# Patient Record
Sex: FEMALE | Race: ASIAN | NOT HISPANIC OR LATINO
[De-identification: names, ages, dates, MRNs, and addresses within clinical notes are randomized per-mention and may not be internally consistent; named-entity substitution may affect disease eponyms.]

---

## 2018-05-01 PROBLEM — Z00.00 ENCOUNTER FOR PREVENTIVE HEALTH EXAMINATION: Status: ACTIVE | Noted: 2018-05-01

## 2018-05-09 ENCOUNTER — APPOINTMENT (OUTPATIENT)
Dept: BARIATRICS | Facility: CLINIC | Age: 38
End: 2018-05-09
Payer: MEDICAID

## 2018-06-20 ENCOUNTER — LABORATORY RESULT (OUTPATIENT)
Age: 38
End: 2018-06-20

## 2018-06-20 ENCOUNTER — APPOINTMENT (OUTPATIENT)
Dept: BARIATRICS | Facility: CLINIC | Age: 38
End: 2018-06-20
Payer: MEDICAID

## 2018-06-20 VITALS
SYSTOLIC BLOOD PRESSURE: 156 MMHG | TEMPERATURE: 98.3 F | DIASTOLIC BLOOD PRESSURE: 111 MMHG | HEART RATE: 107 BPM | BODY MASS INDEX: 53.92 KG/M2 | OXYGEN SATURATION: 97 % | WEIGHT: 293 LBS | HEIGHT: 62 IN

## 2018-06-20 DIAGNOSIS — Z83.3 FAMILY HISTORY OF DIABETES MELLITUS: ICD-10-CM

## 2018-06-20 DIAGNOSIS — Z78.9 OTHER SPECIFIED HEALTH STATUS: ICD-10-CM

## 2018-06-20 DIAGNOSIS — Z87.19 PERSONAL HISTORY OF OTHER DISEASES OF THE DIGESTIVE SYSTEM: ICD-10-CM

## 2018-06-20 PROCEDURE — 99205 OFFICE O/P NEW HI 60 MIN: CPT

## 2018-06-25 ENCOUNTER — OTHER (OUTPATIENT)
Age: 38
End: 2018-06-25

## 2018-06-25 DIAGNOSIS — D50.9 IRON DEFICIENCY ANEMIA, UNSPECIFIED: ICD-10-CM

## 2018-06-25 LAB
ALBUMIN SERPL ELPH-MCNC: 3.9 G/DL
ALP BLD-CCNC: 61 U/L
ALT SERPL-CCNC: 32 U/L
ANION GAP SERPL CALC-SCNC: 16 MMOL/L
APTT BLD: 35.9 SEC
AST SERPL-CCNC: 28 U/L
BASOPHILS # BLD AUTO: 0.04 K/UL
BASOPHILS NFR BLD AUTO: 0.4 %
BILIRUB SERPL-MCNC: 0.2 MG/DL
BUN SERPL-MCNC: 17 MG/DL
CALCIUM SERPL-MCNC: 9.7 MG/DL
CHLORIDE SERPL-SCNC: 99 MMOL/L
CHOLEST SERPL-MCNC: 136 MG/DL
CHOLEST/HDLC SERPL: 2.3 RATIO
CO2 SERPL-SCNC: 26 MMOL/L
CREAT SERPL-MCNC: 0.7 MG/DL
EOSINOPHIL # BLD AUTO: 0.23 K/UL
EOSINOPHIL NFR BLD AUTO: 2.4 %
FERRITIN SERPL-MCNC: 15 NG/ML
FOLATE SERPL-MCNC: 12.8 NG/ML
GLUCOSE SERPL-MCNC: 102 MG/DL
HBA1C MFR BLD HPLC: 6 %
HCT VFR BLD CALC: 38.2 %
HDLC SERPL-MCNC: 59 MG/DL
HGB BLD-MCNC: 10.9 G/DL
IMM GRANULOCYTES NFR BLD AUTO: 0.4 %
INR PPP: 1.04 RATIO
IRON SATN MFR SERPL: 6 %
IRON SERPL-MCNC: 27 UG/DL
LDLC SERPL CALC-MCNC: 56 MG/DL
LYMPHOCYTES # BLD AUTO: 3.21 K/UL
LYMPHOCYTES NFR BLD AUTO: 33.1 %
MAN DIFF?: NORMAL
MCHC RBC-ENTMCNC: 21.2 PG
MCHC RBC-ENTMCNC: 28.5 GM/DL
MCV RBC AUTO: 74.2 FL
MONOCYTES # BLD AUTO: 0.61 K/UL
MONOCYTES NFR BLD AUTO: 6.3 %
NEUTROPHILS # BLD AUTO: 5.58 K/UL
NEUTROPHILS NFR BLD AUTO: 57.4 %
PLATELET # BLD AUTO: 452 K/UL
POTASSIUM SERPL-SCNC: 4.4 MMOL/L
PROT SERPL-MCNC: 8.4 G/DL
PT BLD: 11.8 SEC
RBC # BLD: 5.15 M/UL
RBC # FLD: 18.3 %
SODIUM SERPL-SCNC: 141 MMOL/L
TIBC SERPL-MCNC: 423 UG/DL
TRIGL SERPL-MCNC: 104 MG/DL
TSH SERPL-ACNC: 2.89 UIU/ML
UIBC SERPL-MCNC: 396 UG/DL
VIT B1 SERPL-MCNC: 112.6 NMOL/L
VIT B12 SERPL-MCNC: 406 PG/ML
WBC # FLD AUTO: 9.71 K/UL

## 2018-07-02 ENCOUNTER — APPOINTMENT (OUTPATIENT)
Dept: CARDIOLOGY | Facility: CLINIC | Age: 38
End: 2018-07-02
Payer: MEDICAID

## 2018-07-02 VITALS
SYSTOLIC BLOOD PRESSURE: 118 MMHG | HEIGHT: 62 IN | HEART RATE: 110 BPM | BODY MASS INDEX: 53.92 KG/M2 | DIASTOLIC BLOOD PRESSURE: 78 MMHG | OXYGEN SATURATION: 98 % | WEIGHT: 293 LBS | RESPIRATION RATE: 18 BRPM

## 2018-07-02 PROCEDURE — 99203 OFFICE O/P NEW LOW 30 MIN: CPT

## 2018-07-02 PROCEDURE — 93000 ELECTROCARDIOGRAM COMPLETE: CPT

## 2018-07-10 ENCOUNTER — APPOINTMENT (OUTPATIENT)
Dept: PULMONOLOGY | Facility: CLINIC | Age: 38
End: 2018-07-10
Payer: MEDICAID

## 2018-07-10 VITALS
OXYGEN SATURATION: 84 % | HEART RATE: 122 BPM | DIASTOLIC BLOOD PRESSURE: 104 MMHG | BODY MASS INDEX: 53.92 KG/M2 | HEIGHT: 62 IN | TEMPERATURE: 99.2 F | SYSTOLIC BLOOD PRESSURE: 146 MMHG | WEIGHT: 293 LBS

## 2018-07-10 PROCEDURE — 94726 PLETHYSMOGRAPHY LUNG VOLUMES: CPT

## 2018-07-10 PROCEDURE — ZZZZZ: CPT

## 2018-07-10 PROCEDURE — 94010 BREATHING CAPACITY TEST: CPT

## 2018-07-10 PROCEDURE — 94729 DIFFUSING CAPACITY: CPT

## 2018-07-10 PROCEDURE — 99203 OFFICE O/P NEW LOW 30 MIN: CPT | Mod: 25

## 2018-07-16 ENCOUNTER — APPOINTMENT (OUTPATIENT)
Dept: CARDIOLOGY | Facility: CLINIC | Age: 38
End: 2018-07-16

## 2018-07-19 ENCOUNTER — APPOINTMENT (OUTPATIENT)
Dept: PULMONOLOGY | Facility: CLINIC | Age: 38
End: 2018-07-19

## 2018-07-19 ENCOUNTER — APPOINTMENT (OUTPATIENT)
Dept: GASTROENTEROLOGY | Facility: CLINIC | Age: 38
End: 2018-07-19
Payer: MEDICAID

## 2018-07-19 ENCOUNTER — OTHER (OUTPATIENT)
Age: 38
End: 2018-07-19

## 2018-07-19 VITALS
RESPIRATION RATE: 18 BRPM | HEIGHT: 62 IN | DIASTOLIC BLOOD PRESSURE: 84 MMHG | OXYGEN SATURATION: 90 % | BODY MASS INDEX: 53.92 KG/M2 | WEIGHT: 293 LBS | TEMPERATURE: 99.6 F | SYSTOLIC BLOOD PRESSURE: 144 MMHG | HEART RATE: 115 BPM

## 2018-07-19 PROCEDURE — 99203 OFFICE O/P NEW LOW 30 MIN: CPT

## 2018-07-25 ENCOUNTER — OTHER (OUTPATIENT)
Age: 38
End: 2018-07-25

## 2018-08-06 ENCOUNTER — OUTPATIENT (OUTPATIENT)
Dept: OUTPATIENT SERVICES | Facility: HOSPITAL | Age: 38
LOS: 1 days | End: 2018-08-06
Payer: MEDICAID

## 2018-08-06 ENCOUNTER — APPOINTMENT (OUTPATIENT)
Dept: CARDIOLOGY | Facility: CLINIC | Age: 38
End: 2018-08-06

## 2018-08-06 DIAGNOSIS — Z00.8 ENCOUNTER FOR OTHER GENERAL EXAMINATION: ICD-10-CM

## 2018-08-06 PROCEDURE — 93306 TTE W/DOPPLER COMPLETE: CPT | Mod: 26

## 2018-08-06 PROCEDURE — 93306 TTE W/DOPPLER COMPLETE: CPT

## 2018-08-07 ENCOUNTER — CHART COPY (OUTPATIENT)
Age: 38
End: 2018-08-07

## 2018-08-13 ENCOUNTER — APPOINTMENT (OUTPATIENT)
Dept: CARDIOLOGY | Facility: CLINIC | Age: 38
End: 2018-08-13
Payer: MEDICAID

## 2018-08-13 ENCOUNTER — APPOINTMENT (OUTPATIENT)
Dept: CARDIOLOGY | Facility: CLINIC | Age: 38
End: 2018-08-13

## 2018-08-13 ENCOUNTER — OUTPATIENT (OUTPATIENT)
Dept: OUTPATIENT SERVICES | Facility: HOSPITAL | Age: 38
LOS: 1 days | End: 2018-08-13

## 2018-08-13 VITALS
RESPIRATION RATE: 18 BRPM | OXYGEN SATURATION: 97 % | DIASTOLIC BLOOD PRESSURE: 92 MMHG | WEIGHT: 293 LBS | HEIGHT: 62 IN | BODY MASS INDEX: 53.92 KG/M2 | HEART RATE: 107 BPM | SYSTOLIC BLOOD PRESSURE: 166 MMHG

## 2018-08-13 DIAGNOSIS — Z00.8 ENCOUNTER FOR OTHER GENERAL EXAMINATION: ICD-10-CM

## 2018-08-13 PROCEDURE — 99213 OFFICE O/P EST LOW 20 MIN: CPT

## 2018-09-05 ENCOUNTER — OUTPATIENT (OUTPATIENT)
Dept: OUTPATIENT SERVICES | Facility: HOSPITAL | Age: 38
LOS: 1 days | End: 2018-09-05
Payer: MEDICAID

## 2018-09-05 ENCOUNTER — RESULT REVIEW (OUTPATIENT)
Age: 38
End: 2018-09-05

## 2018-09-05 DIAGNOSIS — Z01.818 ENCOUNTER FOR OTHER PREPROCEDURAL EXAMINATION: ICD-10-CM

## 2018-09-05 LAB — HCG UR QL: NEGATIVE — SIGNIFICANT CHANGE UP

## 2018-09-05 PROCEDURE — 43239 EGD BIOPSY SINGLE/MULTIPLE: CPT

## 2018-09-05 PROCEDURE — 81025 URINE PREGNANCY TEST: CPT

## 2018-09-05 PROCEDURE — 88312 SPECIAL STAINS GROUP 1: CPT | Mod: 26

## 2018-09-05 PROCEDURE — 88305 TISSUE EXAM BY PATHOLOGIST: CPT

## 2018-09-05 PROCEDURE — 88305 TISSUE EXAM BY PATHOLOGIST: CPT | Mod: 26

## 2018-09-05 PROCEDURE — 88312 SPECIAL STAINS GROUP 1: CPT

## 2018-09-10 ENCOUNTER — APPOINTMENT (OUTPATIENT)
Dept: CARDIOLOGY | Facility: CLINIC | Age: 38
End: 2018-09-10

## 2018-09-19 ENCOUNTER — OTHER (OUTPATIENT)
Age: 38
End: 2018-09-19

## 2018-10-04 ENCOUNTER — RESULT CHARGE (OUTPATIENT)
Age: 38
End: 2018-10-04

## 2018-10-04 ENCOUNTER — RESULT REVIEW (OUTPATIENT)
Age: 38
End: 2018-10-04

## 2018-10-04 DIAGNOSIS — A04.8 OTHER SPECIFIED BACTERIAL INTESTINAL INFECTIONS: ICD-10-CM

## 2018-10-08 ENCOUNTER — APPOINTMENT (OUTPATIENT)
Dept: CARDIOLOGY | Facility: CLINIC | Age: 38
End: 2018-10-08
Payer: MEDICAID

## 2018-10-08 VITALS
HEIGHT: 62 IN | HEART RATE: 112 BPM | SYSTOLIC BLOOD PRESSURE: 145 MMHG | OXYGEN SATURATION: 95 % | RESPIRATION RATE: 18 BRPM | DIASTOLIC BLOOD PRESSURE: 83 MMHG | BODY MASS INDEX: 53.92 KG/M2 | WEIGHT: 293 LBS

## 2018-10-08 DIAGNOSIS — R06.02 SHORTNESS OF BREATH: ICD-10-CM

## 2018-10-08 PROCEDURE — 99214 OFFICE O/P EST MOD 30 MIN: CPT

## 2018-10-09 ENCOUNTER — OTHER (OUTPATIENT)
Age: 38
End: 2018-10-09

## 2018-10-09 RX ORDER — AMOXICILLIN 500 MG/1
500 CAPSULE ORAL TWICE DAILY
Qty: 56 | Refills: 0 | Status: DISCONTINUED | COMMUNITY
Start: 2018-10-04 | End: 2018-10-09

## 2018-10-17 ENCOUNTER — OTHER (OUTPATIENT)
Age: 38
End: 2018-10-17

## 2018-10-18 RX ORDER — OMEPRAZOLE 20 MG/1
20 CAPSULE, DELAYED RELEASE ORAL TWICE DAILY
Qty: 28 | Refills: 0 | Status: DISCONTINUED | COMMUNITY
Start: 2018-10-04 | End: 2018-10-18

## 2018-10-22 ENCOUNTER — APPOINTMENT (OUTPATIENT)
Dept: CARDIOLOGY | Facility: CLINIC | Age: 38
End: 2018-10-22

## 2018-10-22 ENCOUNTER — OUTPATIENT (OUTPATIENT)
Dept: OUTPATIENT SERVICES | Facility: HOSPITAL | Age: 38
LOS: 1 days | End: 2018-10-22
Payer: MEDICAID

## 2018-10-22 DIAGNOSIS — Z00.8 ENCOUNTER FOR OTHER GENERAL EXAMINATION: ICD-10-CM

## 2018-10-22 PROCEDURE — 93018 CV STRESS TEST I&R ONLY: CPT

## 2018-10-22 PROCEDURE — 93016 CV STRESS TEST SUPVJ ONLY: CPT

## 2018-10-22 PROCEDURE — 93325 DOPPLER ECHO COLOR FLOW MAPG: CPT

## 2018-10-22 PROCEDURE — 93320 DOPPLER ECHO COMPLETE: CPT | Mod: 26

## 2018-10-22 PROCEDURE — 93320 DOPPLER ECHO COMPLETE: CPT

## 2018-10-22 PROCEDURE — 93350 STRESS TTE ONLY: CPT | Mod: 26

## 2018-10-22 PROCEDURE — 93351 STRESS TTE COMPLETE: CPT

## 2018-10-22 PROCEDURE — 93325 DOPPLER ECHO COLOR FLOW MAPG: CPT | Mod: 26

## 2018-10-22 PROCEDURE — 93017 CV STRESS TEST TRACING ONLY: CPT

## 2018-12-03 ENCOUNTER — APPOINTMENT (OUTPATIENT)
Dept: SURGERY | Facility: CLINIC | Age: 38
End: 2018-12-03
Payer: MEDICAID

## 2018-12-03 VITALS
BODY MASS INDEX: 50.02 KG/M2 | RESPIRATION RATE: 18 BRPM | TEMPERATURE: 98.7 F | DIASTOLIC BLOOD PRESSURE: 91 MMHG | SYSTOLIC BLOOD PRESSURE: 141 MMHG | HEIGHT: 64 IN | HEART RATE: 115 BPM | OXYGEN SATURATION: 96 % | WEIGHT: 293 LBS

## 2018-12-03 DIAGNOSIS — M54.9 DORSALGIA, UNSPECIFIED: ICD-10-CM

## 2018-12-03 PROCEDURE — 99205 OFFICE O/P NEW HI 60 MIN: CPT

## 2018-12-03 RX ORDER — RANITIDINE HYDROCHLORIDE 300 MG/1
TABLET, FILM COATED ORAL
Refills: 0 | Status: DISCONTINUED | COMMUNITY
End: 2018-12-03

## 2018-12-03 RX ORDER — METRONIDAZOLE 500 MG/1
500 TABLET ORAL EVERY 8 HOURS
Qty: 42 | Refills: 0 | Status: DISCONTINUED | COMMUNITY
Start: 2018-10-09 | End: 2018-12-03

## 2018-12-03 RX ORDER — CLARITHROMYCIN 500 MG/1
500 TABLET, FILM COATED ORAL TWICE DAILY
Qty: 28 | Refills: 0 | Status: DISCONTINUED | COMMUNITY
Start: 2018-10-04 | End: 2018-12-03

## 2018-12-03 RX ORDER — RANITIDINE 150 MG/1
150 TABLET ORAL
Refills: 0 | Status: DISCONTINUED | COMMUNITY

## 2018-12-03 RX ORDER — FERROUS SULFATE 325(65) MG
325 (65 FE) TABLET ORAL
Qty: 30 | Refills: 2 | Status: DISCONTINUED | COMMUNITY
Start: 2018-06-25 | End: 2018-12-03

## 2019-02-05 ENCOUNTER — APPOINTMENT (OUTPATIENT)
Dept: PULMONOLOGY | Facility: CLINIC | Age: 39
End: 2019-02-05
Payer: MEDICAID

## 2019-02-05 VITALS
HEIGHT: 64 IN | HEART RATE: 126 BPM | OXYGEN SATURATION: 96 % | BODY MASS INDEX: 50.02 KG/M2 | WEIGHT: 293 LBS | TEMPERATURE: 98.7 F | DIASTOLIC BLOOD PRESSURE: 107 MMHG | SYSTOLIC BLOOD PRESSURE: 157 MMHG

## 2019-02-05 PROCEDURE — 99213 OFFICE O/P EST LOW 20 MIN: CPT

## 2019-02-06 NOTE — PHYSICAL EXAM
[General Appearance - Well Developed] : well developed [Normal Appearance] : normal appearance [Well Groomed] : well groomed [General Appearance - Well Nourished] : well nourished [No Deformities] : no deformities [General Appearance - In No Acute Distress] : no acute distress [Normal Conjunctiva] : the conjunctiva exhibited no abnormalities [Normal Oropharynx] : normal oropharynx [IV] : IV [Neck Appearance] : the appearance of the neck was normal [Neck Cervical Mass (___cm)] : no neck mass was observed [Jugular Venous Distention Increased] : there was no jugular-venous distention [Thyroid Diffuse Enlargement] : the thyroid was not enlarged [Heart Rate And Rhythm] : heart rate and rhythm were normal [Heart Sounds] : normal S1 and S2 [Murmurs] : no murmurs present [Arterial Pulses Normal] : the arterial pulses were normal [Respiration, Rhythm And Depth] : normal respiratory rhythm and effort [Exaggerated Use Of Accessory Muscles For Inspiration] : no accessory muscle use [Auscultation Breath Sounds / Voice Sounds] : lungs were clear to auscultation bilaterally [Bowel Sounds] : normal bowel sounds [Abdomen Soft] : soft [Abdomen Tenderness] : non-tender [] : no hepato-splenomegaly [Abnormal Walk] : normal gait [Nail Clubbing] : no clubbing of the fingernails [Cyanosis, Localized] : no localized cyanosis [No Focal Deficits] : no focal deficits [Oriented To Time, Place, And Person] : oriented to person, place, and time [Impaired Insight] : insight and judgment were intact [FreeTextEntry1] : Short thick neck

## 2019-02-06 NOTE — ASSESSMENT
[FreeTextEntry1] : 1) Preop Pulmonary Assessment for Bariatric surgery\par 2) Morbid obesity BMI-63. \par 3) Severe BEAU on CPAP

## 2019-02-26 ENCOUNTER — APPOINTMENT (OUTPATIENT)
Dept: GASTROENTEROLOGY | Facility: CLINIC | Age: 39
End: 2019-02-26
Payer: MEDICAID

## 2019-02-26 VITALS
DIASTOLIC BLOOD PRESSURE: 90 MMHG | SYSTOLIC BLOOD PRESSURE: 148 MMHG | HEIGHT: 64 IN | TEMPERATURE: 99.2 F | BODY MASS INDEX: 50.02 KG/M2 | WEIGHT: 293 LBS | HEART RATE: 126 BPM | OXYGEN SATURATION: 97 %

## 2019-02-26 PROCEDURE — 99213 OFFICE O/P EST LOW 20 MIN: CPT

## 2019-02-26 NOTE — ASSESSMENT
[FreeTextEntry1] : 38 year old female presents for follow up after H. pylori treatment. \par \par Test for eradication today

## 2019-02-26 NOTE — HISTORY OF PRESENT ILLNESS
[de-identified] : 38 year old female presents for follow up for H. pylori. She was started on triple therapy but medication needed to be adjusted to secondary to a reaction to the amoxicillin. She is s.p  two week therapy of clarithromycin/Flagyl and PPI

## 2019-02-27 ENCOUNTER — OTHER (OUTPATIENT)
Age: 39
End: 2019-02-27

## 2019-02-28 LAB — UREA BREATH TEST QL: NEGATIVE

## 2019-03-04 ENCOUNTER — APPOINTMENT (OUTPATIENT)
Dept: SURGERY | Facility: CLINIC | Age: 39
End: 2019-03-04
Payer: MEDICAID

## 2019-03-04 VITALS
WEIGHT: 293 LBS | HEIGHT: 62 IN | SYSTOLIC BLOOD PRESSURE: 134 MMHG | RESPIRATION RATE: 17 BRPM | BODY MASS INDEX: 53.92 KG/M2 | DIASTOLIC BLOOD PRESSURE: 89 MMHG | OXYGEN SATURATION: 99 % | HEART RATE: 105 BPM

## 2019-03-04 PROCEDURE — 99213 OFFICE O/P EST LOW 20 MIN: CPT

## 2019-03-04 RX ORDER — MULTIVITAMIN
TABLET ORAL
Refills: 0 | Status: ACTIVE | COMMUNITY

## 2019-03-04 RX ORDER — NAPROXEN SODIUM 220 MG
TABLET ORAL
Refills: 0 | Status: DISCONTINUED | COMMUNITY
End: 2019-03-04

## 2019-03-04 NOTE — PHYSICAL EXAM
[Obese] : obese [de-identified] : Normoactive bowel sounds, improved hepatosplenomegaly, no masses, non-tender.

## 2019-03-04 NOTE — HISTORY OF PRESENT ILLNESS
[de-identified] : Christelle is a 37 y/o female here for a pre-op weight check. Last seen 12/03/18: 5 feet 2 inches 344 pounds (BMI= 63). \par Today patient weighs 326.6 lbs. BMI 59.63 [de-identified] : patient on a calorie restriction low-carb diet with 20 pound weight loss. He knows that further weight loss as needed to prepare for surgery.

## 2019-04-08 ENCOUNTER — NON-APPOINTMENT (OUTPATIENT)
Age: 39
End: 2019-04-08

## 2019-04-08 ENCOUNTER — APPOINTMENT (OUTPATIENT)
Dept: CARDIOLOGY | Facility: CLINIC | Age: 39
End: 2019-04-08
Payer: MEDICAID

## 2019-04-08 VITALS
DIASTOLIC BLOOD PRESSURE: 88 MMHG | HEIGHT: 62 IN | SYSTOLIC BLOOD PRESSURE: 138 MMHG | WEIGHT: 293 LBS | HEART RATE: 122 BPM | BODY MASS INDEX: 53.92 KG/M2 | OXYGEN SATURATION: 96 %

## 2019-04-08 DIAGNOSIS — R00.0 TACHYCARDIA, UNSPECIFIED: ICD-10-CM

## 2019-04-08 PROCEDURE — 93000 ELECTROCARDIOGRAM COMPLETE: CPT

## 2019-04-08 PROCEDURE — 99214 OFFICE O/P EST MOD 30 MIN: CPT

## 2019-04-09 NOTE — PHYSICAL EXAM
[General Appearance - Well Developed] : well developed [Normal Appearance] : normal appearance [General Appearance - Well Nourished] : well nourished [Well Groomed] : well groomed [General Appearance - In No Acute Distress] : no acute distress [Normal Conjunctiva] : the conjunctiva exhibited no abnormalities [No Deformities] : no deformities [Eyelids - No Xanthelasma] : the eyelids demonstrated no xanthelasmas [Normal Oral Mucosa] : normal oral mucosa [No Oral Pallor] : no oral pallor [No Oral Cyanosis] : no oral cyanosis [Respiration, Rhythm And Depth] : normal respiratory rhythm and effort [Auscultation Breath Sounds / Voice Sounds] : lungs were clear to auscultation bilaterally [Arterial Pulses Normal] : the arterial pulses were normal [Murmurs] : no murmurs present [Heart Sounds] : normal S1 and S2 [Edema] : no peripheral edema present [Bowel Sounds] : normal bowel sounds [Abdomen Soft] : soft [Abdomen Tenderness] : non-tender [Abnormal Walk] : normal gait [Nail Clubbing] : no clubbing of the fingernails [Cyanosis, Localized] : no localized cyanosis [Skin Color & Pigmentation] : normal skin color and pigmentation [Skin Turgor] : normal skin turgor [] : no rash [Oriented To Time, Place, And Person] : oriented to person, place, and time [Impaired Insight] : insight and judgment were intact [No Anxiety] : not feeling anxious [FreeTextEntry1] : tachycardic.

## 2019-04-09 NOTE — DISCUSSION/SUMMARY
[FreeTextEntry1] : In a summary Christelle Smith is a young female with hypertension, diastolic BP high. Continue Norvasc. Sinus tachycardia, denies any caffeinated drinks.Drink plenty of water. Started Metoprolol ER 25 mg orally once daily. Follow up in 2 weeks.

## 2019-04-09 NOTE — REVIEW OF SYSTEMS
[Eye Pain] : eye pain [Negative] : Endocrine [Shortness Of Breath] : shortness of breath [Seeing Double (Diplopia)] : no diplopia [Blurry Vision] : no blurred vision [Eyeglasses] : not currently wearing eyeglasses [Chest Pain] : no chest pain [Lower Ext Edema] : no extremity edema [Palpitations] : no palpitations [Cough] : no cough [Wheezing] : no wheezing [Coughing Up Blood] : no hemoptysis [Abdominal Pain] : no abdominal pain [Nausea] : no nausea [Vomiting] : no vomiting [Heartburn] : no heartburn [Change in Appetite] : no change in appetite [Change In The Stool] : no change in stool [Easy Bleeding] : no tendency for easy bleeding [Dysphagia] : no dysphagia [Easy Bruising] : no tendency for easy bruising

## 2019-04-09 NOTE — HISTORY OF PRESENT ILLNESS
[FreeTextEntry1] : Christelle Smith is a 38 year old female with history of hypertension and Morbid obesity comes for follow up visit. Denies any chest pain or palpitations. Chronic mild shortness of breath on exertion relieved with rest in few minutes is unchanged. Compliant to medications. and low salt diet.

## 2019-04-22 ENCOUNTER — APPOINTMENT (OUTPATIENT)
Dept: CARDIOLOGY | Facility: CLINIC | Age: 39
End: 2019-04-22
Payer: MEDICAID

## 2019-04-22 ENCOUNTER — NON-APPOINTMENT (OUTPATIENT)
Age: 39
End: 2019-04-22

## 2019-04-22 VITALS
HEIGHT: 62 IN | BODY MASS INDEX: 53.92 KG/M2 | OXYGEN SATURATION: 99 % | HEART RATE: 87 BPM | DIASTOLIC BLOOD PRESSURE: 88 MMHG | SYSTOLIC BLOOD PRESSURE: 142 MMHG | WEIGHT: 293 LBS

## 2019-04-22 PROCEDURE — 93000 ELECTROCARDIOGRAM COMPLETE: CPT

## 2019-04-22 PROCEDURE — 99214 OFFICE O/P EST MOD 30 MIN: CPT

## 2019-04-22 NOTE — REASON FOR VISIT
[Follow-Up - Clinic] : a clinic follow-up of [Hypertension] : hypertension [FreeTextEntry1] : PRE-OP cardiac evaluation.

## 2019-04-22 NOTE — PHYSICAL EXAM
[General Appearance - Well Developed] : well developed [Normal Appearance] : normal appearance [General Appearance - Well Nourished] : well nourished [Well Groomed] : well groomed [No Deformities] : no deformities [General Appearance - In No Acute Distress] : no acute distress [Normal Conjunctiva] : the conjunctiva exhibited no abnormalities [Eyelids - No Xanthelasma] : the eyelids demonstrated no xanthelasmas [Normal Oral Mucosa] : normal oral mucosa [No Oral Pallor] : no oral pallor [No Oral Cyanosis] : no oral cyanosis [Respiration, Rhythm And Depth] : normal respiratory rhythm and effort [Auscultation Breath Sounds / Voice Sounds] : lungs were clear to auscultation bilaterally [Heart Sounds] : normal S1 and S2 [Murmurs] : no murmurs present [Arterial Pulses Normal] : the arterial pulses were normal [Edema] : no peripheral edema present [Bowel Sounds] : normal bowel sounds [Abdomen Soft] : soft [Abdomen Tenderness] : non-tender [Abnormal Walk] : normal gait [Nail Clubbing] : no clubbing of the fingernails [Skin Color & Pigmentation] : normal skin color and pigmentation [Cyanosis, Localized] : no localized cyanosis [] : no rash [Skin Turgor] : normal skin turgor [Impaired Insight] : insight and judgment were intact [Oriented To Time, Place, And Person] : oriented to person, place, and time [No Anxiety] : not feeling anxious [FreeTextEntry1] : No JVD

## 2019-04-22 NOTE — REVIEW OF SYSTEMS
[Eye Pain] : eye pain [Shortness Of Breath] : shortness of breath [Negative] : Endocrine [Blurry Vision] : no blurred vision [Seeing Double (Diplopia)] : no diplopia [Eyeglasses] : not currently wearing eyeglasses [Chest Pain] : no chest pain [Palpitations] : no palpitations [Lower Ext Edema] : no extremity edema [Wheezing] : no wheezing [Cough] : no cough [Abdominal Pain] : no abdominal pain [Nausea] : no nausea [Coughing Up Blood] : no hemoptysis [Heartburn] : no heartburn [Vomiting] : no vomiting [Change In The Stool] : no change in stool [Change in Appetite] : no change in appetite [Dysphagia] : no dysphagia [Easy Bleeding] : no tendency for easy bleeding [Easy Bruising] : no tendency for easy bruising

## 2019-04-22 NOTE — DISCUSSION/SUMMARY
[FreeTextEntry1] : In a summary Ms. Sheth, Christelle is a young female with hypertension, diastolic BP high. Continue current medications and 2 gm sodium diet. Tachycardia, heart rate in 90's. Continue Metoprolol. With normal cardiac work up she will be low risk cardiac wise for sleeve gastrectomy. Explained Ms. Sheth in detail. Healthy lifestyle. Follow up in 3 months.

## 2019-05-06 ENCOUNTER — APPOINTMENT (OUTPATIENT)
Dept: SURGERY | Facility: CLINIC | Age: 39
End: 2019-05-06
Payer: MEDICAID

## 2019-05-06 PROCEDURE — 99215 OFFICE O/P EST HI 40 MIN: CPT

## 2019-05-07 ENCOUNTER — FORM ENCOUNTER (OUTPATIENT)
Age: 39
End: 2019-05-07

## 2019-05-08 ENCOUNTER — APPOINTMENT (OUTPATIENT)
Dept: ULTRASOUND IMAGING | Facility: CLINIC | Age: 39
End: 2019-05-08

## 2019-05-08 ENCOUNTER — APPOINTMENT (OUTPATIENT)
Dept: RADIOLOGY | Facility: HOSPITAL | Age: 39
End: 2019-05-08

## 2019-05-08 ENCOUNTER — OUTPATIENT (OUTPATIENT)
Dept: OUTPATIENT SERVICES | Facility: HOSPITAL | Age: 39
LOS: 1 days | End: 2019-05-08
Payer: MEDICAID

## 2019-05-08 DIAGNOSIS — I10 ESSENTIAL (PRIMARY) HYPERTENSION: ICD-10-CM

## 2019-05-08 DIAGNOSIS — D50.9 IRON DEFICIENCY ANEMIA, UNSPECIFIED: ICD-10-CM

## 2019-05-08 DIAGNOSIS — Z01.818 ENCOUNTER FOR OTHER PREPROCEDURAL EXAMINATION: ICD-10-CM

## 2019-05-08 DIAGNOSIS — R06.02 SHORTNESS OF BREATH: ICD-10-CM

## 2019-05-08 DIAGNOSIS — E66.01 MORBID (SEVERE) OBESITY DUE TO EXCESS CALORIES: ICD-10-CM

## 2019-05-08 PROCEDURE — 76700 US EXAM ABDOM COMPLETE: CPT

## 2019-05-08 PROCEDURE — 74241: CPT

## 2019-05-08 PROCEDURE — 76700 US EXAM ABDOM COMPLETE: CPT | Mod: 26

## 2019-05-08 PROCEDURE — 74241: CPT | Mod: 26

## 2019-05-13 ENCOUNTER — OUTPATIENT (OUTPATIENT)
Dept: OUTPATIENT SERVICES | Facility: HOSPITAL | Age: 39
LOS: 1 days | End: 2019-05-13
Payer: MEDICAID

## 2019-05-13 VITALS
WEIGHT: 293 LBS | TEMPERATURE: 98 F | HEIGHT: 62 IN | RESPIRATION RATE: 15 BRPM | SYSTOLIC BLOOD PRESSURE: 145 MMHG | HEART RATE: 111 BPM | DIASTOLIC BLOOD PRESSURE: 90 MMHG | OXYGEN SATURATION: 97 %

## 2019-05-13 DIAGNOSIS — G47.33 OBSTRUCTIVE SLEEP APNEA (ADULT) (PEDIATRIC): ICD-10-CM

## 2019-05-13 DIAGNOSIS — E66.01 MORBID (SEVERE) OBESITY DUE TO EXCESS CALORIES: ICD-10-CM

## 2019-05-13 DIAGNOSIS — Z29.9 ENCOUNTER FOR PROPHYLACTIC MEASURES, UNSPECIFIED: ICD-10-CM

## 2019-05-13 DIAGNOSIS — Z01.818 ENCOUNTER FOR OTHER PREPROCEDURAL EXAMINATION: ICD-10-CM

## 2019-05-13 LAB
ALBUMIN SERPL ELPH-MCNC: 4.6 G/DL — SIGNIFICANT CHANGE UP (ref 3.3–5)
ALP SERPL-CCNC: 57 U/L — SIGNIFICANT CHANGE UP (ref 40–120)
ALT FLD-CCNC: 46 U/L — HIGH (ref 10–45)
ANION GAP SERPL CALC-SCNC: 16 MMOL/L — SIGNIFICANT CHANGE UP (ref 5–17)
AST SERPL-CCNC: 35 U/L — SIGNIFICANT CHANGE UP (ref 10–40)
BILIRUB SERPL-MCNC: 0.4 MG/DL — SIGNIFICANT CHANGE UP (ref 0.2–1.2)
BLD GP AB SCN SERPL QL: NEGATIVE — SIGNIFICANT CHANGE UP
BUN SERPL-MCNC: 8 MG/DL — SIGNIFICANT CHANGE UP (ref 7–23)
CALCIUM SERPL-MCNC: 10.1 MG/DL — SIGNIFICANT CHANGE UP (ref 8.4–10.5)
CHLORIDE SERPL-SCNC: 98 MMOL/L — SIGNIFICANT CHANGE UP (ref 96–108)
CO2 SERPL-SCNC: 21 MMOL/L — LOW (ref 22–31)
CREAT SERPL-MCNC: 0.52 MG/DL — SIGNIFICANT CHANGE UP (ref 0.5–1.3)
GLUCOSE SERPL-MCNC: 80 MG/DL — SIGNIFICANT CHANGE UP (ref 70–99)
HBA1C BLD-MCNC: 5.7 % — HIGH (ref 4–5.6)
HCT VFR BLD CALC: 45.9 % — HIGH (ref 34.5–45)
HGB BLD-MCNC: 13.8 G/DL — SIGNIFICANT CHANGE UP (ref 11.5–15.5)
MCHC RBC-ENTMCNC: 23.9 PG — LOW (ref 27–34)
MCHC RBC-ENTMCNC: 30.1 GM/DL — LOW (ref 32–36)
MCV RBC AUTO: 79.4 FL — LOW (ref 80–100)
PLATELET # BLD AUTO: 387 K/UL — SIGNIFICANT CHANGE UP (ref 150–400)
POTASSIUM SERPL-MCNC: 3.7 MMOL/L — SIGNIFICANT CHANGE UP (ref 3.5–5.3)
POTASSIUM SERPL-SCNC: 3.7 MMOL/L — SIGNIFICANT CHANGE UP (ref 3.5–5.3)
PROT SERPL-MCNC: 8.7 G/DL — HIGH (ref 6–8.3)
RBC # BLD: 5.78 M/UL — HIGH (ref 3.8–5.2)
RBC # FLD: 17.5 % — HIGH (ref 10.3–14.5)
RH IG SCN BLD-IMP: POSITIVE — SIGNIFICANT CHANGE UP
SODIUM SERPL-SCNC: 135 MMOL/L — SIGNIFICANT CHANGE UP (ref 135–145)
WBC # BLD: 10.43 K/UL — SIGNIFICANT CHANGE UP (ref 3.8–10.5)
WBC # FLD AUTO: 10.43 K/UL — SIGNIFICANT CHANGE UP (ref 3.8–10.5)

## 2019-05-13 PROCEDURE — 83036 HEMOGLOBIN GLYCOSYLATED A1C: CPT

## 2019-05-13 PROCEDURE — 85027 COMPLETE CBC AUTOMATED: CPT

## 2019-05-13 PROCEDURE — 86850 RBC ANTIBODY SCREEN: CPT

## 2019-05-13 PROCEDURE — 86901 BLOOD TYPING SEROLOGIC RH(D): CPT

## 2019-05-13 PROCEDURE — G0463: CPT

## 2019-05-13 PROCEDURE — 80053 COMPREHEN METABOLIC PANEL: CPT

## 2019-05-13 PROCEDURE — 86900 BLOOD TYPING SEROLOGIC ABO: CPT

## 2019-05-13 RX ORDER — VANCOMYCIN HCL 1 G
1500 VIAL (EA) INTRAVENOUS ONCE
Refills: 0 | Status: DISCONTINUED | OUTPATIENT
Start: 2019-05-22 | End: 2019-05-23

## 2019-05-13 NOTE — H&P PST ADULT - ASSESSMENT

## 2019-05-13 NOTE — H&P PST ADULT - HISTORY OF PRESENT ILLNESS
38 year-old morbidly obese, HTN, BEAU with CPAP.  failed multiple nonsurgical weight loss programs (weight watchers). presents to PST scheduled for sleeve gastrectomy surgery. 38 year-old morbidly obese, HTN, BEAU with CPAP.  failed multiple nonsurgical weight loss programs (weight watchers). presents to PST scheduled for sleeve gastrectomy surgery.      Emailed pt's info to pharmacists, Clyde Faustin and Mary Grace Degroot for post op medications change instructions.  pt is aware she'll get a phone call from pharmacist and to contact Dr. Granda is does not hear from a pharmacist.

## 2019-05-13 NOTE — H&P PST ADULT - NSICDXPASTMEDICALHX_GEN_ALL_CORE_FT
PAST MEDICAL HISTORY:  Morbid obesity     BEAU on CPAP PAST MEDICAL HISTORY:  Morbid obesity     BEAU on CPAP     Tachycardia evaluated by cardiologist, on metoprolol PAST MEDICAL HISTORY:  Gastric ulcer treated with medication- resolved without surgical intervention    Hypertension     Morbid obesity     BEAU on CPAP     Tachycardia evaluated by cardiologist, on metoprolol

## 2019-05-13 NOTE — H&P PST ADULT - NSICDXPROBLEM_GEN_ALL_CORE_FT
PROBLEM DIAGNOSES  Problem: Need for prophylactic measure  Assessment and Plan: The Caprini score indicates this patient is at risk for a VTE event (score 3-5).  Most surgical patients in this group would benefit from pharmacologic prophylaxis.  The surgical team will determine the balance between VTE risk and bleeding risk     Problem: BEAU on CPAP  Assessment and Plan: BEAU precautions  OR booking notified     Problem: Morbid obesity  Assessment and Plan: PROBLEM DIAGNOSES  Problem: Need for prophylactic measure  Assessment and Plan: The Caprini score indicates this patient is at risk for a VTE event (score 3-5).  Most surgical patients in this group would benefit from pharmacologic prophylaxis.  The surgical team will determine the balance between VTE risk and bleeding risk     Problem: BEAU on CPAP  Assessment and Plan: BEAU precautions  OR booking notified     Problem: Morbid obesity  Assessment and Plan: laparoscopic vertical sleeve gastrectomy

## 2019-05-15 ENCOUNTER — MEDICATION RENEWAL (OUTPATIENT)
Age: 39
End: 2019-05-15

## 2019-05-15 RX ORDER — METOPROLOL SUCCINATE 25 MG/1
25 TABLET, EXTENDED RELEASE ORAL DAILY
Qty: 30 | Refills: 3 | Status: DISCONTINUED | COMMUNITY
Start: 2019-04-08 | End: 2019-05-15

## 2019-05-15 NOTE — PHARMACOTHERAPY INTERVENTION NOTE - COMMENTS
Vertical sleeve gastrectomy scheduled for 5/22/2019.  Patient medication reconciliation completed. Patient currently taking:     Amlodipine 5mg by mouth daily   Metoprolol succinate ER 25mg by mouth daily  Aleve as needed   Multivitamin patch topically daily    Patient was instructed to use crushed, dissolvable, chewable, or liquid formulations of medications for 1 month. Patient was informed to take daily multivitamins post surgically. Patient reeducated on NSAID avoidance (ibuprofen, ASA, naproxen, aleve) as they increased risk of GI bleeding; may use APAP for mild pain otherwise contact prescriber for consult. Patient was informed on indications and directions for administration for hyoscyamine SL, acetaminophen liquid, ondansetron ODT, and omeprazole DR. Patient was instructed to take the medications as follows:     Crush amlodipine  Switch metoprolol succinate to metoprolol tartrate 25mg by mouth twice daily (Rx sent by cardiologist Dr. Gutierres on 5/15)  Continue vitamins/supplements in patch forms  Stop aleve, switch to tylenol as needed    Clyde Faustin, PharmD  [phone]

## 2019-05-21 ENCOUNTER — TRANSCRIPTION ENCOUNTER (OUTPATIENT)
Age: 39
End: 2019-05-21

## 2019-05-22 ENCOUNTER — RESULT REVIEW (OUTPATIENT)
Age: 39
End: 2019-05-22

## 2019-05-22 ENCOUNTER — APPOINTMENT (OUTPATIENT)
Dept: SURGERY | Facility: HOSPITAL | Age: 39
End: 2019-05-22
Payer: MEDICAID

## 2019-05-22 ENCOUNTER — INPATIENT (INPATIENT)
Facility: HOSPITAL | Age: 39
LOS: 0 days | Discharge: HOME | DRG: 620 | End: 2019-05-23
Attending: SURGERY | Admitting: SURGERY
Payer: MEDICAID

## 2019-05-22 VITALS
OXYGEN SATURATION: 99 % | DIASTOLIC BLOOD PRESSURE: 92 MMHG | HEART RATE: 112 BPM | WEIGHT: 293 LBS | RESPIRATION RATE: 18 BRPM | TEMPERATURE: 99 F | HEIGHT: 62 IN | SYSTOLIC BLOOD PRESSURE: 145 MMHG

## 2019-05-22 DIAGNOSIS — E66.01 MORBID (SEVERE) OBESITY DUE TO EXCESS CALORIES: ICD-10-CM

## 2019-05-22 LAB
ANION GAP SERPL CALC-SCNC: 16 MMOL/L — SIGNIFICANT CHANGE UP (ref 5–17)
BASOPHILS # BLD AUTO: 0 K/UL — SIGNIFICANT CHANGE UP (ref 0–0.2)
BASOPHILS NFR BLD AUTO: 0 % — SIGNIFICANT CHANGE UP (ref 0–2)
BUN SERPL-MCNC: 8 MG/DL — SIGNIFICANT CHANGE UP (ref 7–23)
CALCIUM SERPL-MCNC: 9.1 MG/DL — SIGNIFICANT CHANGE UP (ref 8.4–10.5)
CHLORIDE SERPL-SCNC: 96 MMOL/L — SIGNIFICANT CHANGE UP (ref 96–108)
CO2 SERPL-SCNC: 21 MMOL/L — LOW (ref 22–31)
CREAT SERPL-MCNC: 0.64 MG/DL — SIGNIFICANT CHANGE UP (ref 0.5–1.3)
EOSINOPHIL # BLD AUTO: 0 K/UL — SIGNIFICANT CHANGE UP (ref 0–0.5)
EOSINOPHIL NFR BLD AUTO: 0.2 % — SIGNIFICANT CHANGE UP (ref 0–6)
GLUCOSE BLDC GLUCOMTR-MCNC: 104 MG/DL — HIGH (ref 70–99)
GLUCOSE SERPL-MCNC: 194 MG/DL — HIGH (ref 70–99)
HCG UR QL: NEGATIVE — SIGNIFICANT CHANGE UP
HCT VFR BLD CALC: 39.5 % — SIGNIFICANT CHANGE UP (ref 34.5–45)
HGB BLD-MCNC: 12.7 G/DL — SIGNIFICANT CHANGE UP (ref 11.5–15.5)
LYMPHOCYTES # BLD AUTO: 14.6 % — SIGNIFICANT CHANGE UP (ref 13–44)
LYMPHOCYTES # BLD AUTO: 2.3 K/UL — SIGNIFICANT CHANGE UP (ref 1–3.3)
MAGNESIUM SERPL-MCNC: 2 MG/DL — SIGNIFICANT CHANGE UP (ref 1.6–2.6)
MCHC RBC-ENTMCNC: 25 PG — LOW (ref 27–34)
MCHC RBC-ENTMCNC: 32.2 GM/DL — SIGNIFICANT CHANGE UP (ref 32–36)
MCV RBC AUTO: 77.6 FL — LOW (ref 80–100)
MONOCYTES # BLD AUTO: 0.4 K/UL — SIGNIFICANT CHANGE UP (ref 0–0.9)
MONOCYTES NFR BLD AUTO: 2.5 % — SIGNIFICANT CHANGE UP (ref 2–14)
NEUTROPHILS # BLD AUTO: 13.1 K/UL — HIGH (ref 1.8–7.4)
NEUTROPHILS NFR BLD AUTO: 82.6 % — HIGH (ref 43–77)
PHOSPHATE SERPL-MCNC: 2.5 MG/DL — SIGNIFICANT CHANGE UP (ref 2.5–4.5)
PLATELET # BLD AUTO: 327 K/UL — SIGNIFICANT CHANGE UP (ref 150–400)
POTASSIUM SERPL-MCNC: 3.6 MMOL/L — SIGNIFICANT CHANGE UP (ref 3.5–5.3)
POTASSIUM SERPL-SCNC: 3.6 MMOL/L — SIGNIFICANT CHANGE UP (ref 3.5–5.3)
RBC # BLD: 5.08 M/UL — SIGNIFICANT CHANGE UP (ref 3.8–5.2)
RBC # FLD: 14.7 % — HIGH (ref 10.3–14.5)
RH IG SCN BLD-IMP: POSITIVE — SIGNIFICANT CHANGE UP
SODIUM SERPL-SCNC: 133 MMOL/L — LOW (ref 135–145)
WBC # BLD: 15.9 K/UL — HIGH (ref 3.8–10.5)
WBC # FLD AUTO: 15.9 K/UL — HIGH (ref 3.8–10.5)

## 2019-05-22 PROCEDURE — 43775 LAP SLEEVE GASTRECTOMY: CPT

## 2019-05-22 PROCEDURE — 88305 TISSUE EXAM BY PATHOLOGIST: CPT | Mod: 26

## 2019-05-22 PROCEDURE — 43281 LAP PARAESOPHAG HERN REPAIR: CPT | Mod: 59

## 2019-05-22 RX ORDER — HYOSCYAMINE SULFATE 0.13 MG
0.12 TABLET ORAL EVERY 6 HOURS
Refills: 0 | Status: DISCONTINUED | OUTPATIENT
Start: 2019-05-22 | End: 2019-05-23

## 2019-05-22 RX ORDER — FENTANYL CITRATE 50 UG/ML
25 INJECTION INTRAVENOUS
Refills: 0 | Status: DISCONTINUED | OUTPATIENT
Start: 2019-05-22 | End: 2019-05-22

## 2019-05-22 RX ORDER — HYDROMORPHONE HYDROCHLORIDE 2 MG/ML
0.5 INJECTION INTRAMUSCULAR; INTRAVENOUS; SUBCUTANEOUS ONCE
Refills: 0 | Status: DISCONTINUED | OUTPATIENT
Start: 2019-05-22 | End: 2019-05-23

## 2019-05-22 RX ORDER — LIDOCAINE HCL 20 MG/ML
0.2 VIAL (ML) INJECTION ONCE
Refills: 0 | Status: DISCONTINUED | OUTPATIENT
Start: 2019-05-22 | End: 2019-05-22

## 2019-05-22 RX ORDER — ONDANSETRON 8 MG/1
4 TABLET, FILM COATED ORAL EVERY 6 HOURS
Refills: 0 | Status: DISCONTINUED | OUTPATIENT
Start: 2019-05-22 | End: 2019-05-23

## 2019-05-22 RX ORDER — IBUPROFEN 200 MG
1 TABLET ORAL
Qty: 0 | Refills: 0 | DISCHARGE

## 2019-05-22 RX ORDER — HEPARIN SODIUM 5000 [USP'U]/ML
7500 INJECTION INTRAVENOUS; SUBCUTANEOUS ONCE
Refills: 0 | Status: COMPLETED | OUTPATIENT
Start: 2019-05-22 | End: 2019-05-22

## 2019-05-22 RX ORDER — AMLODIPINE BESYLATE 2.5 MG/1
1 TABLET ORAL
Qty: 0 | Refills: 0 | DISCHARGE

## 2019-05-22 RX ORDER — SIMETHICONE 80 MG/1
80 TABLET, CHEWABLE ORAL ONCE
Refills: 0 | Status: COMPLETED | OUTPATIENT
Start: 2019-05-22 | End: 2019-05-22

## 2019-05-22 RX ORDER — ACETAMINOPHEN 500 MG
1000 TABLET ORAL ONCE
Refills: 0 | Status: COMPLETED | OUTPATIENT
Start: 2019-05-23 | End: 2019-05-23

## 2019-05-22 RX ORDER — CHLORHEXIDINE GLUCONATE 213 G/1000ML
1 SOLUTION TOPICAL DAILY
Refills: 0 | Status: DISCONTINUED | OUTPATIENT
Start: 2019-05-22 | End: 2019-05-22

## 2019-05-22 RX ORDER — SODIUM CHLORIDE 9 MG/ML
1000 INJECTION, SOLUTION INTRAVENOUS
Refills: 0 | Status: DISCONTINUED | OUTPATIENT
Start: 2019-05-22 | End: 2019-05-23

## 2019-05-22 RX ORDER — ACETAMINOPHEN 500 MG
1000 TABLET ORAL ONCE
Refills: 0 | Status: COMPLETED | OUTPATIENT
Start: 2019-05-22 | End: 2019-05-22

## 2019-05-22 RX ORDER — ONDANSETRON 8 MG/1
4 TABLET, FILM COATED ORAL EVERY 4 HOURS
Refills: 0 | Status: DISCONTINUED | OUTPATIENT
Start: 2019-05-22 | End: 2019-05-22

## 2019-05-22 RX ORDER — KETOROLAC TROMETHAMINE 30 MG/ML
30 SYRINGE (ML) INJECTION EVERY 6 HOURS
Refills: 0 | Status: DISCONTINUED | OUTPATIENT
Start: 2019-05-22 | End: 2019-05-23

## 2019-05-22 RX ORDER — FENTANYL CITRATE 50 UG/ML
50 INJECTION INTRAVENOUS
Refills: 0 | Status: DISCONTINUED | OUTPATIENT
Start: 2019-05-22 | End: 2019-05-22

## 2019-05-22 RX ORDER — METOPROLOL TARTRATE 50 MG
1 TABLET ORAL
Qty: 0 | Refills: 0 | DISCHARGE

## 2019-05-22 RX ORDER — HEPARIN SODIUM 5000 [USP'U]/ML
7500 INJECTION INTRAVENOUS; SUBCUTANEOUS EVERY 8 HOURS
Refills: 0 | Status: DISCONTINUED | OUTPATIENT
Start: 2019-05-22 | End: 2019-05-23

## 2019-05-22 RX ORDER — METOPROLOL TARTRATE 50 MG
5 TABLET ORAL EVERY 6 HOURS
Refills: 0 | Status: DISCONTINUED | OUTPATIENT
Start: 2019-05-22 | End: 2019-05-23

## 2019-05-22 RX ORDER — FAMOTIDINE 10 MG/ML
20 INJECTION INTRAVENOUS ONCE
Refills: 0 | Status: COMPLETED | OUTPATIENT
Start: 2019-05-22 | End: 2019-05-22

## 2019-05-22 RX ORDER — SODIUM CHLORIDE 9 MG/ML
3 INJECTION INTRAMUSCULAR; INTRAVENOUS; SUBCUTANEOUS EVERY 8 HOURS
Refills: 0 | Status: DISCONTINUED | OUTPATIENT
Start: 2019-05-22 | End: 2019-05-22

## 2019-05-22 RX ORDER — PANTOPRAZOLE SODIUM 20 MG/1
40 TABLET, DELAYED RELEASE ORAL EVERY 24 HOURS
Refills: 0 | Status: DISCONTINUED | OUTPATIENT
Start: 2019-05-22 | End: 2019-05-23

## 2019-05-22 RX ORDER — VANCOMYCIN HCL 1 G
1500 VIAL (EA) INTRAVENOUS ONCE
Refills: 0 | Status: COMPLETED | OUTPATIENT
Start: 2019-05-22 | End: 2019-05-22

## 2019-05-22 RX ADMIN — Medication 0.12 MILLIGRAM(S): at 23:47

## 2019-05-22 RX ADMIN — FENTANYL CITRATE 25 MICROGRAM(S): 50 INJECTION INTRAVENOUS at 11:50

## 2019-05-22 RX ADMIN — Medication 0.12 MILLIGRAM(S): at 17:45

## 2019-05-22 RX ADMIN — Medication 30 MILLIGRAM(S): at 22:01

## 2019-05-22 RX ADMIN — Medication 1000 MILLIGRAM(S): at 22:01

## 2019-05-22 RX ADMIN — HEPARIN SODIUM 7500 UNIT(S): 5000 INJECTION INTRAVENOUS; SUBCUTANEOUS at 14:16

## 2019-05-22 RX ADMIN — Medication 0.12 MILLIGRAM(S): at 13:02

## 2019-05-22 RX ADMIN — FENTANYL CITRATE 50 MICROGRAM(S): 50 INJECTION INTRAVENOUS at 10:55

## 2019-05-22 RX ADMIN — Medication 30 MILLIGRAM(S): at 15:30

## 2019-05-22 RX ADMIN — FENTANYL CITRATE 50 MICROGRAM(S): 50 INJECTION INTRAVENOUS at 10:40

## 2019-05-22 RX ADMIN — SODIUM CHLORIDE 250 MILLILITER(S): 9 INJECTION, SOLUTION INTRAVENOUS at 11:38

## 2019-05-22 RX ADMIN — Medication 30 MILLIGRAM(S): at 15:00

## 2019-05-22 RX ADMIN — Medication 30 MILLIGRAM(S): at 21:47

## 2019-05-22 RX ADMIN — ONDANSETRON 4 MILLIGRAM(S): 8 TABLET, FILM COATED ORAL at 17:45

## 2019-05-22 RX ADMIN — FAMOTIDINE 20 MILLIGRAM(S): 10 INJECTION INTRAVENOUS at 13:06

## 2019-05-22 RX ADMIN — FENTANYL CITRATE 25 MICROGRAM(S): 50 INJECTION INTRAVENOUS at 11:36

## 2019-05-22 RX ADMIN — Medication 300 MILLIGRAM(S): at 21:47

## 2019-05-22 RX ADMIN — Medication 1000 MILLIGRAM(S): at 14:45

## 2019-05-22 RX ADMIN — HEPARIN SODIUM 7500 UNIT(S): 5000 INJECTION INTRAVENOUS; SUBCUTANEOUS at 21:46

## 2019-05-22 RX ADMIN — PANTOPRAZOLE SODIUM 40 MILLIGRAM(S): 20 TABLET, DELAYED RELEASE ORAL at 10:38

## 2019-05-22 RX ADMIN — Medication 400 MILLIGRAM(S): at 14:15

## 2019-05-22 RX ADMIN — Medication 5 MILLIGRAM(S): at 23:47

## 2019-05-22 RX ADMIN — ONDANSETRON 4 MILLIGRAM(S): 8 TABLET, FILM COATED ORAL at 23:47

## 2019-05-22 RX ADMIN — Medication 5 MILLIGRAM(S): at 13:09

## 2019-05-22 RX ADMIN — HEPARIN SODIUM 7500 UNIT(S): 5000 INJECTION INTRAVENOUS; SUBCUTANEOUS at 06:10

## 2019-05-22 RX ADMIN — ONDANSETRON 4 MILLIGRAM(S): 8 TABLET, FILM COATED ORAL at 12:00

## 2019-05-22 RX ADMIN — SODIUM CHLORIDE 150 MILLILITER(S): 9 INJECTION, SOLUTION INTRAVENOUS at 10:56

## 2019-05-22 RX ADMIN — SIMETHICONE 80 MILLIGRAM(S): 80 TABLET, CHEWABLE ORAL at 13:01

## 2019-05-22 RX ADMIN — Medication 5 MILLIGRAM(S): at 17:45

## 2019-05-22 RX ADMIN — Medication 400 MILLIGRAM(S): at 21:47

## 2019-05-22 NOTE — BRIEF OPERATIVE NOTE - ELECTIVE PROCEDURE
Yes 75 yo female with AIH, DVT/PE presented for diffuse ecchymosis    # Left leg eruption w/ SIRS:  - r/o cellulitis vs progression of DVT vs warfarin necrosis (unlikely since patient has taken med for a long time)  - leukocytosis, lacatatemia, left leg swelling   - c/w vancomycin  - s/p fluid challenge. lactate 3 > 2 per ED  - f/u burn  - f/u u/a, u cx and bl cx    # diffuse ecchymosis:  - non blanching ecchymosis in multiple skin sites  - warfarin toxicity less likely ( INR wnl)  - possible vasculitis, f/u ESR    # DVT/PE:  - on chronic coumadin  - f/u AM INR and  start heparin gtt if INR<2  - f/u LE dupplex    # Autoimmune hepatitis:  - f/u tacrolimus level  - c/w tacrolimus, budesonide, prednisone  - no signs of active disease    # DVT PPX: warfarin/heparin  Full code 75 yo female with AIH, DVT/PE presented for left leg erupting skin lesion.    # Left leg eruption w/ SIRS:  - r/o cellulitis vs progression of DVT vs warfarin necrosis (unlikely since patient has taken med for a long time)  - leukocytosis, lacatatemia, left leg swelling   - c/w vancomycin  - s/p fluid challenge. lactate 3 > 2 per ED  - f/u burn  - f/u u/a, u cx and bl cx    # scattered ecchymotic rash:  - non blanching ecchymosis in multiple skin sites  - warfarin toxicity less likely ( INR wnl)  - possible vasculitis, f/u ESR    # DVT/PE:  - on chronic coumadin  - f/u AM INR and  start heparin gtt if INR<2  - f/u LE dupplex    # Autoimmune hepatitis:  - f/u tacrolimus level  - c/w tacrolimus, budesonide, prednisone  - no signs of active disease    # DVT PPX: warfarin/heparin  Full code

## 2019-05-22 NOTE — CHART NOTE - NSCHARTNOTEFT_GEN_A_CORE
Post-operative Check    SUBJECTIVE: No acute events in the immediate post-operative period. Abdominal pain well controlled. Pt had nausea earlier, but was given Zofran, which helped. Pt complaining of reflux pain. She has no ambulated because of it, but otherwise feels like she would be able to ambulate. She did not void yet. She says she had a nausea medication at another hospital which helped.      OBJECTIVE:  T(C): 36.4 (05-22-19 @ 09:39), Max: 37.4 (05-22-19 @ 06:14)  HR: 79 (05-22-19 @ 13:30) (64 - 112)  BP: 142/87 (05-22-19 @ 13:30) (126/79 - 159/103)  RR: 16 (05-22-19 @ 13:30) (16 - 18)  SpO2: 100% (05-22-19 @ 13:30) (91% - 100%)      05-22-19 @ 07:01  -  05-22-19 @ 13:33  --------------------------------------------------------  IN: 750 mL / OUT: 0 mL / NET: 750 mL    Physical Exam:   Gen: uncomfortable, sitting in chair, large  Resp: No increased work of breathing, on NC  Abd: s, nt, large, 3 port sites w steri strips c/d/i    ASSESSMENT:   38F s/p laparoscopic sleeve gastrectomy 5/22.     - Pain management  - Follow UOP  - Zofran, simethicone and Pepcid  - Appropriate for transfer to the [facility]  [id number]

## 2019-05-22 NOTE — BRIEF OPERATIVE NOTE - NSICDXBRIEFPROCEDURE_GEN_ALL_CORE_FT
PROCEDURES:  Laparoscopic sleeve gastrectomy with laparoscopic repair of hiatal hernia 22-May-2019 10:18:11  [place]

## 2019-05-22 NOTE — PRE-ANESTHESIA EVALUATION ADULT - NSANTHPEFT_GEN_ALL_CORE
General: Alert and oriented x 3, well-groomed, morbidly obese  Heart: RRR, no M/G/R  Lungs: CTABL  Neuro: Grossly intact

## 2019-05-23 ENCOUNTER — TRANSCRIPTION ENCOUNTER (OUTPATIENT)
Age: 39
End: 2019-05-23

## 2019-05-23 VITALS
TEMPERATURE: 98 F | SYSTOLIC BLOOD PRESSURE: 118 MMHG | OXYGEN SATURATION: 99 % | RESPIRATION RATE: 18 BRPM | DIASTOLIC BLOOD PRESSURE: 68 MMHG | HEART RATE: 76 BPM

## 2019-05-23 DIAGNOSIS — E55.9 VITAMIN D DEFICIENCY, UNSPECIFIED: ICD-10-CM

## 2019-05-23 LAB
ANION GAP SERPL CALC-SCNC: 16 MMOL/L — SIGNIFICANT CHANGE UP (ref 5–17)
BASOPHILS # BLD AUTO: 0 K/UL — SIGNIFICANT CHANGE UP (ref 0–0.2)
BASOPHILS NFR BLD AUTO: 0.2 % — SIGNIFICANT CHANGE UP (ref 0–2)
BUN SERPL-MCNC: 5 MG/DL — LOW (ref 7–23)
CALCIUM SERPL-MCNC: 9.4 MG/DL — SIGNIFICANT CHANGE UP (ref 8.4–10.5)
CHLORIDE SERPL-SCNC: 103 MMOL/L — SIGNIFICANT CHANGE UP (ref 96–108)
CO2 SERPL-SCNC: 21 MMOL/L — LOW (ref 22–31)
CREAT SERPL-MCNC: 0.62 MG/DL — SIGNIFICANT CHANGE UP (ref 0.5–1.3)
EOSINOPHIL # BLD AUTO: 0 K/UL — SIGNIFICANT CHANGE UP (ref 0–0.5)
EOSINOPHIL NFR BLD AUTO: 0.2 % — SIGNIFICANT CHANGE UP (ref 0–6)
GLUCOSE BLDC GLUCOMTR-MCNC: 118 MG/DL — HIGH (ref 70–99)
GLUCOSE BLDC GLUCOMTR-MCNC: 127 MG/DL — HIGH (ref 70–99)
GLUCOSE SERPL-MCNC: 139 MG/DL — HIGH (ref 70–99)
HCT VFR BLD CALC: 38.9 % — SIGNIFICANT CHANGE UP (ref 34.5–45)
HGB BLD-MCNC: 12.3 G/DL — SIGNIFICANT CHANGE UP (ref 11.5–15.5)
LYMPHOCYTES # BLD AUTO: 17.2 % — SIGNIFICANT CHANGE UP (ref 13–44)
LYMPHOCYTES # BLD AUTO: 2.3 K/UL — SIGNIFICANT CHANGE UP (ref 1–3.3)
MAGNESIUM SERPL-MCNC: 2.1 MG/DL — SIGNIFICANT CHANGE UP (ref 1.6–2.6)
MCHC RBC-ENTMCNC: 24.3 PG — LOW (ref 27–34)
MCHC RBC-ENTMCNC: 31.7 GM/DL — LOW (ref 32–36)
MCV RBC AUTO: 76.6 FL — LOW (ref 80–100)
MONOCYTES # BLD AUTO: 0.8 K/UL — SIGNIFICANT CHANGE UP (ref 0–0.9)
MONOCYTES NFR BLD AUTO: 6 % — SIGNIFICANT CHANGE UP (ref 2–14)
NEUTROPHILS # BLD AUTO: 10 K/UL — HIGH (ref 1.8–7.4)
NEUTROPHILS NFR BLD AUTO: 76.4 % — SIGNIFICANT CHANGE UP (ref 43–77)
PHOSPHATE SERPL-MCNC: 2.3 MG/DL — LOW (ref 2.5–4.5)
PLATELET # BLD AUTO: 404 K/UL — HIGH (ref 150–400)
POTASSIUM SERPL-MCNC: 4.1 MMOL/L — SIGNIFICANT CHANGE UP (ref 3.5–5.3)
POTASSIUM SERPL-SCNC: 4.1 MMOL/L — SIGNIFICANT CHANGE UP (ref 3.5–5.3)
RBC # BLD: 5.07 M/UL — SIGNIFICANT CHANGE UP (ref 3.8–5.2)
RBC # FLD: 14.7 % — HIGH (ref 10.3–14.5)
SODIUM SERPL-SCNC: 140 MMOL/L — SIGNIFICANT CHANGE UP (ref 135–145)
WBC # BLD: 13.1 K/UL — HIGH (ref 3.8–10.5)
WBC # FLD AUTO: 13.1 K/UL — HIGH (ref 3.8–10.5)

## 2019-05-23 RX ORDER — OMEPRAZOLE 10 MG/1
1 CAPSULE, DELAYED RELEASE ORAL
Qty: 30 | Refills: 0
Start: 2019-05-23 | End: 2019-06-21

## 2019-05-23 RX ORDER — ACETAMINOPHEN 500 MG
15 TABLET ORAL
Qty: 300 | Refills: 0
Start: 2019-05-23 | End: 2019-05-27

## 2019-05-23 RX ORDER — ONDANSETRON 8 MG/1
1 TABLET, FILM COATED ORAL
Qty: 21 | Refills: 0
Start: 2019-05-23 | End: 2019-05-29

## 2019-05-23 RX ORDER — HYOSCYAMINE SULFATE 0.13 MG
1 TABLET ORAL
Qty: 28 | Refills: 0
Start: 2019-05-23 | End: 2019-05-29

## 2019-05-23 RX ADMIN — Medication 0.12 MILLIGRAM(S): at 05:37

## 2019-05-23 RX ADMIN — ONDANSETRON 4 MILLIGRAM(S): 8 TABLET, FILM COATED ORAL at 05:37

## 2019-05-23 RX ADMIN — Medication 0.12 MILLIGRAM(S): at 11:46

## 2019-05-23 RX ADMIN — Medication 5 MILLIGRAM(S): at 05:37

## 2019-05-23 RX ADMIN — HEPARIN SODIUM 7500 UNIT(S): 5000 INJECTION INTRAVENOUS; SUBCUTANEOUS at 15:33

## 2019-05-23 RX ADMIN — Medication 30 MILLIGRAM(S): at 05:00

## 2019-05-23 RX ADMIN — Medication 30 MILLIGRAM(S): at 08:58

## 2019-05-23 RX ADMIN — Medication 30 MILLIGRAM(S): at 09:28

## 2019-05-23 RX ADMIN — Medication 30 MILLIGRAM(S): at 04:26

## 2019-05-23 RX ADMIN — Medication 5 MILLIGRAM(S): at 11:43

## 2019-05-23 RX ADMIN — HEPARIN SODIUM 7500 UNIT(S): 5000 INJECTION INTRAVENOUS; SUBCUTANEOUS at 05:37

## 2019-05-23 RX ADMIN — Medication 62.5 MILLIMOLE(S): at 11:40

## 2019-05-23 RX ADMIN — ONDANSETRON 4 MILLIGRAM(S): 8 TABLET, FILM COATED ORAL at 11:41

## 2019-05-23 RX ADMIN — Medication 1000 MILLIGRAM(S): at 05:00

## 2019-05-23 RX ADMIN — PANTOPRAZOLE SODIUM 40 MILLIGRAM(S): 20 TABLET, DELAYED RELEASE ORAL at 08:58

## 2019-05-23 RX ADMIN — Medication 400 MILLIGRAM(S): at 04:26

## 2019-05-23 NOTE — DISCHARGE NOTE PROVIDER - NSDCCPTREATMENT_GEN_ALL_CORE_FT
PRINCIPAL PROCEDURE  Procedure: Laparoscopic sleeve gastrectomy with laparoscopic repair of hiatal hernia  Findings and Treatment: Pain management, dietary supplement, bariatric staged meal progression, follow up care, behavioral health and nutritional counselling PRINCIPAL PROCEDURE  Procedure: Laparoscopic sleeve gastrectomy with laparoscopic repair of hiatal hernia  Findings and Treatment: Pain management, dietary supplement, bariatric staged meal progression, follow up care, behavioral health and nutritional counselling      SECONDARY PROCEDURE  Procedure: Laparoscopic sleeve gastrectomy with laparoscopic repair of hiatal hernia  Findings and Treatment:

## 2019-05-23 NOTE — DIETITIAN INITIAL EVALUATION ADULT. - NS FNS WEIGHT USED FOR CALC
ideal/110 pounds , 6 cups of fluid (48 ounces) to start working up to at least 8 cups (64 ounces) per day

## 2019-05-23 NOTE — DIETITIAN INITIAL EVALUATION ADULT. - ADHERENCE
Pt reports following a full liquid diet for 2 weeks PTA as instructed by outpatient RD. Pt reports having Premier Protein, jello and pudding. Pt uses multivitamin patches. NKFA./good

## 2019-05-23 NOTE — DISCHARGE NOTE PROVIDER - NSDCCPCAREPLAN_GEN_ALL_CORE_FT
PRINCIPAL DISCHARGE DIAGNOSIS  Diagnosis: Morbid obesity  Assessment and Plan of Treatment: Lose weight PRINCIPAL DISCHARGE DIAGNOSIS  Diagnosis: Morbid obesity  Assessment and Plan of Treatment: Lose weight      SECONDARY DISCHARGE DIAGNOSES  Diagnosis: Gastroesophageal reflux disease with hiatal hernia  Assessment and Plan of Treatment: To continue prilosec for 30 days

## 2019-05-23 NOTE — PROGRESS NOTE ADULT - SUBJECTIVE AND OBJECTIVE BOX
Post Op Day#: 1    Subjective: "Im comfortable, no pain, no N/V"    Objective: No acute events overnight. Continuous pulse oximetry at bedside functioning,  v/s stable, afebrile. WBC trending downwards . Pt OOB and ambulated independently around the unit last night.  Tolerating bariatric clear liquid diet .  Voiding                                               Vital Signs Last 24 Hrs  T(C): 36.9 (23 May 2019 09:36), Max: 37.6 (22 May 2019 20:20)  T(F): 98.4 (23 May 2019 09:36), Max: 99.6 (22 May 2019 20:20)  HR: 87 (23 May 2019 10:31) (64 - 98)  BP: 147/91 (23 May 2019 09:36) (137/85 - 170/96)  BP(mean): 126 (22 May 2019 14:30) (105 - 126)  RR: 18 (23 May 2019 09:36) (16 - 19)  SpO2: 100% (23 May 2019 10:31) (91% - 100%)                                               I&O's Summary    22 May 2019 07:01  -  23 May 2019 07:00  --------------------------------------------------------  IN: 3900 mL / OUT: 950 mL / NET: 2950 mL                                                                          12.3   13.1  )-----------( 404      ( 23 May 2019 09:03 )             38.9                                                 05-23    140  |  103  |  5<L>  ----------------------------<  139<H>  4.1   |  21<L>  |  0.62    Ca    9.4      23 May 2019 09:03  Phos  2.3     05-23  Mg     2.1     05-23      heparin  Injectable 7500 Unit(s) SubCutaneous every 8 hours  HYDROmorphone  Injectable 0.5 milliGRAM(s) IV Push once  hyoscyamine SL 0.125 milliGRAM(s) SubLingual every 6 hours  lactated ringers. 1000 milliLiter(s) IV Continuous <Continuous>  metoprolol tartrate Injectable 5 milliGRAM(s) IV Push every 6 hours  multivitamin/thiamine/folic acid in sodium chloride 0.9% 1000 milliLiter(s) IV Continuous <Continuous>  ondansetron Injectable 4 milliGRAM(s) IV Push every 6 hours  pantoprazole  Injectable 40 milliGRAM(s) IV Push every 24 hours  sodium phosphate IVPB 15 milliMole(s) IV Intermittent once  vancomycin  IVPB 1500 milliGRAM(s) IV Intermittent once      Physical Exam:         Lungs:  clear breath sounds b/l       Heart:  Regular rate & rhythm       Abdomen:  Soft, non-distended.  Scopes sites clean, dry and intact. + bs, - flatus, no rebound or guarding       Skin:  intact, pannus w/o rash       Extremities: + pulses, no edema, no calf tenderness, negative carlos's     VTE Risk Assessment Scores             Caprini VTE Risk Score:                                                               =5(High), Perioperative and Postoperative VTE prophylaxis   [facility]  VTE Predicted RISK SCORE:   <1(low ), extended postoperative VTE prophylaxis      Assessment and Plan: s/P Lap Sleeve Gastrectomy    - Bariatric Clear diet today then protocol derived staged meal progression supervised by RD in outpatient setting  - DVT/GI prophylaxis, Incentive spirometry  - Ambulate Q 2 hours or as indicated  -  Procedure specific education including diet, vitamins and VTE prevention. Written materials given  - Medication reviewed and reconciled, Bariatric meds obtained from [facility] prior to d/c  -  D/C home once Bariatric 8-Point d/c criteria met  -  Follow up with Dr Granda in 7-10 days, Dietitian and PMD in 30 days.      Alejandra Rivas, ANDRÉS, ANP  [phone] Post Op Day#: 1    Subjective: "Im comfortable, no pain, no N/V"    Objective: No acute events overnight. Continuous pulse oximetry at bedside functioning,  v/s stable, afebrile. WBC trending downwards . Hypophosphatemic @ 2.3, sodium phophate replacement.  Pt OOB and ambulated independently around the unit last night.  Tolerating bariatric clear liquid diet .  Voiding                                               Vital Signs Last 24 Hrs  T(C): 36.9 (23 May 2019 09:36), Max: 37.6 (22 May 2019 20:20)  T(F): 98.4 (23 May 2019 09:36), Max: 99.6 (22 May 2019 20:20)  HR: 87 (23 May 2019 10:31) (64 - 98)  BP: 147/91 (23 May 2019 09:36) (137/85 - 170/96)  BP(mean): 126 (22 May 2019 14:30) (105 - 126)  RR: 18 (23 May 2019 09:36) (16 - 19)  SpO2: 100% (23 May 2019 10:31) (91% - 100%)                                               I&O's Summary    22 May 2019 07:01  -  23 May 2019 07:00  --------------------------------------------------------  IN: 3900 mL / OUT: 950 mL / NET: 2950 mL                                                                          12.3   13.1  )-----------( 404      ( 23 May 2019 09:03 )             38.9                                                 05-23    140  |  103  |  5<L>  ----------------------------<  139<H>  4.1   |  21<L>  |  0.62    Ca    9.4      23 May 2019 09:03  Phos  2.3     05-23  Mg     2.1     05-23      heparin  Injectable 7500 Unit(s) SubCutaneous every 8 hours  HYDROmorphone  Injectable 0.5 milliGRAM(s) IV Push once  hyoscyamine SL 0.125 milliGRAM(s) SubLingual every 6 hours  lactated ringers. 1000 milliLiter(s) IV Continuous <Continuous>  metoprolol tartrate Injectable 5 milliGRAM(s) IV Push every 6 hours  multivitamin/thiamine/folic acid in sodium chloride 0.9% 1000 milliLiter(s) IV Continuous <Continuous>  ondansetron Injectable 4 milliGRAM(s) IV Push every 6 hours  pantoprazole  Injectable 40 milliGRAM(s) IV Push every 24 hours  sodium phosphate IVPB 15 milliMole(s) IV Intermittent once  vancomycin  IVPB 1500 milliGRAM(s) IV Intermittent once      Physical Exam:         Lungs:  clear breath sounds b/l       Heart:  Regular rate & rhythm       Abdomen:  Soft, non-distended.  Scopes sites clean, dry and intact. + bs, - flatus, no rebound or guarding       Skin:  intact, pannus w/o rash       Extremities: + pulses, no edema, no calf tenderness, negative carlos's     VTE Risk Assessment Scores             Caprini VTE Risk Score:                                                               =5(High), Perioperative and Postoperative VTE prophylaxis   [facility]  VTE Predicted RISK SCORE:   <1(low ), extended postoperative VTE prophylaxis      Assessment and Plan: S/P Lap HH Repair and Sleeve Gastrectomy    - Sodium phosophate 15mmols x 1  - Bariatric Clear diet today then protocol derived staged meal progression supervised by RD in outpatient setting  - DVT/GI prophylaxis, Incentive spirometry  - Ambulate Q 2 hours or as indicated  -  Procedure specific education including diet, vitamins and VTE prevention. Written materials given  - Medication reviewed and reconciled, Bariatric meds obtained from [facility] prior to d/c  -  D/C home once Bariatric 8-Point d/c criteria met  -  Follow up with Dr Granda in 7-10 days, Dietitian and PMD in 30 days.      Alejandra Rivas, ANDRÉS, ANP  [phone]

## 2019-05-23 NOTE — DISCHARGE NOTE NURSING/CASE MANAGEMENT/SOCIAL WORK - NSDCDPATPORTLINK_GEN_ALL_CORE
You can access the [email or web address], offered by [facility], by registering with the following website: [email or web address]

## 2019-05-23 NOTE — DISCHARGE NOTE NURSING/CASE MANAGEMENT/SOCIAL WORK - NSDCPNINST_GEN_ALL_CORE
Instructed to call the MD with surgical site redness, swelling, drainage, pain not relieved with pain medication, nausea/vomiting, fever/chills, inability to tolerate diet. Instructed to crush the medication.

## 2019-05-23 NOTE — DISCHARGE NOTE PROVIDER - HOSPITAL COURSE
On May 22, 2019 Ms Cast  underwent a laparoscopic sleeve gastrectomy for morbid obesity BMI 55. She received DVT and SSI prophylaxis prior to start of surgery. Patient tolerated the procedure well, was extubated in the OR and sent to PACU in stable condition. Once hemodynamically stable and effective pain control, she ambulated with assistance and voided within 4 hours after the procedure.  Her pain was controlled by IV pain non-opioid analgesia then transitioned to liquid as needed. She was later transferred to a bariatric unit and placed on bedside continuous pulse oximetry. She started bariatric clear liquid diet the evening of surgery.        On POD #1 she remained stable with no acute events overnight. She denies pain chills, sweat, epigatric pain and generalized weakness. Hypophosphatemic (2.3) for which she received sodium phosphate. The rest of her hospital course was uneventful, she met bariatric 8-point d/c criteria and she was cleared for discharge home on POD#1. On May 22, 2019 Ms Bourgeois underwent a laparoscopic sleeve gastrectomy for morbid obesity BMI 55. She received DVT and SSI prophylaxis prior to start of surgery. Patient tolerated the procedure well, was extubated in the OR and sent to PACU in stable condition. Once hemodynamically stable and effective pain control, she ambulated with assistance and voided within 4 hours after the procedure.  Her pain was controlled by IV pain non-opioid analgesia then transitioned to liquid as needed. She was later transferred to a bariatric unit and placed on bedside continuous pulse oximetry. She started bariatric clear liquid diet the evening of surgery.        On POD #1 she remained stable with no acute events overnight. She denies pain chills, sweat, epigatric pain and generalized weakness. Hypophosphatemic (2.3) for which she received sodium phosphate. The rest of her hospital course was uneventful, she met bariatric 8-point d/c criteria and she was cleared for discharge home on POD#1. She will follow a protocol-derived staged meal progression supervised by her dietician in the outpatient setting. Patient will follow-up with Dr. Granda in 1-2 weeks.  Patient was also advised to follow-up with her primary medical doctor in 1-2 weeks as well as her nutritionist in 30 days.  [facility] prior to discharge home. Written discharge instruction explained and given to include VTE prevention. On May 22, 2019 Ms Bourgeois underwent a laparoscopic Hiatal Hernia Repair and Sleeve gastrectomy for morbid obesity BMI 55. She received DVT and SSI prophylaxis prior to start of surgery. Patient tolerated the procedure well, was extubated in the OR and sent to PACU in stable condition. Once hemodynamically stable and effective pain control, she ambulated with assistance and voided within 4 hours after the procedure.  Her pain was controlled by IV pain non-opioid analgesia then transitioned to liquid as needed. She was later transferred to a bariatric unit and placed on bedside continuous pulse oximetry. She started bariatric clear liquid diet the evening of surgery.        On POD #1 she remained stable with no acute events overnight. She denies pain chills, sweat, epigatric pain and generalized weakness. Hypophosphatemic (2.3) for which she received sodium phosphate. The rest of her hospital course was uneventful, she met bariatric 8-point d/c criteria and she was cleared for discharge home on POD#1. She will follow a protocol-derived staged meal progression supervised by her dietician in the outpatient setting. Patient will follow-up with Dr. Granda in 1-2 weeks.  Patient was also advised to follow-up with her primary medical doctor in 1-2 weeks as well as her nutritionist in 30 days.  [facility] appropriate [facility] from [facility] prior to discharge home. Written discharge instruction explained and given to include VTE prevention.

## 2019-05-23 NOTE — PHARMACOTHERAPY INTERVENTION NOTE - COMMENTS
Spoke to patient about absorption issues with medications and the need to crush tablets or open capsules for the next 30 days. Reinforced discussion points from previous counseling. Informed patient of new prescriptions sent to pharmacy.    Clyde Faustin, PharmD  [phone]

## 2019-05-23 NOTE — DISCHARGE NOTE PROVIDER - CARE PROVIDER_API CALL
Cayden Granda)  Surgery  [address], [address]  [place] [address]  Phone: [phone]  Fax: [phone]  Follow Up Time:

## 2019-05-23 NOTE — PROGRESS NOTE ADULT - ASSESSMENT
38F s/p laparoscopic sleeve gastrectomy and hiatal hernia repair 5/22.     - clears diet  - Pain management  - Follow UOP  - Zofran, simethicone and Pepcid  - Appropriate for transfer to the floor     [facility]  [id number]

## 2019-05-23 NOTE — DIETITIAN INITIAL EVALUATION ADULT. - OTHER INFO
Pt seen for bariatric surgery consult on 2MON. Pt with multiple previous wt loss attempts per chart. Pt tried the following programs: Weight Watcher's, Marleen Jeovany and diet pills and was unable to lose and maintain significant wt loss. Per chart, pt met with outpatient RD and weighed 319 pounds (4/2). Pre-surgical wt (H&P) noted as 308 pounds (5/13/19). Current wt of 304 pounds (5/22/19). Pt now S/P laparoscopic vertical sleeve gastrectomy. Pt denies N+V, sipping on bariatric clear liquids during RD visit. Pt with knowledge of bariatric full liquid diet and receptive to in depth review/reinforcement. Pt reports home stock of protein shakes with plans to purchase more. Pt reports purchasing the necessary vitamins/minerals in patch form including a multivitamin with iron, calcium, vitamin D, and B12. Pt plans to schedule a follow up appointment with outpatient RD. Pt able to teach back all points discussed during interview.

## 2019-05-23 NOTE — PROGRESS NOTE ADULT - ATTENDING COMMENTS
I saw and examined the patient, and reviewed  the history, operative findings and data with the patient and staff  Agree with note which was also reviewed and edited where appropriate.  D/W patient, RN, residents and Fellow    Doing well.  Continue postop protocol.  Home when meets d/c criteria.

## 2019-05-23 NOTE — PROGRESS NOTE ADULT - SUBJECTIVE AND OBJECTIVE BOX
Surgery Progress Note    Subjective:  Yesterday patient underwent lap sleeve gastrectomy and hiatal hernia repair. She was nauseous in the evening so her diet was not advanced. Pt seen at bedside. Her nausea resolved.     Objective:  Physical Exam:   Gen: uncomfortable, sitting in chair, large  Resp: No increased work of breathing, on NC  Abd: s, nt, large, 3 port sites w steri strips c/d/i        T(C): 36.7 (05-23-19 @ 01:30), Max: 37.6 (05-22-19 @ 20:20)  HR: 72 (05-23-19 @ 01:30) (64 - 112)  BP: 157/98 (05-23-19 @ 01:30) (126/79 - 170/96)  RR: 18 (05-23-19 @ 01:30) (16 - 19)  SpO2: 98% (05-23-19 @ 01:30) (91% - 100%)    05-22-19 @ 07:01  -  05-23-19 @ 05:33  --------------------------------------------------------  IN: 2000 mL / OUT: 950 mL / NET: 1050 mL        LABS                        12.7   15.9  )-----------( 327      ( 22 May 2019 10:51 )             39.5     05-22    133<L>  |  96  |  8   ----------------------------<  194<H>  3.6   |  21<L>  |  0.64    Ca    9.1      22 May 2019 10:51  Phos  2.5     05-22  Mg     2.0     05-22

## 2019-05-24 PROBLEM — E55.9 VITAMIN D DEFICIENCY: Status: ACTIVE | Noted: 2019-05-24

## 2019-05-31 LAB — SURGICAL PATHOLOGY STUDY: SIGNIFICANT CHANGE UP

## 2019-06-03 ENCOUNTER — APPOINTMENT (OUTPATIENT)
Dept: SURGERY | Facility: CLINIC | Age: 39
End: 2019-06-03
Payer: MEDICAID

## 2019-06-03 VITALS
DIASTOLIC BLOOD PRESSURE: 62 MMHG | SYSTOLIC BLOOD PRESSURE: 92 MMHG | HEART RATE: 89 BPM | OXYGEN SATURATION: 99 % | BODY MASS INDEX: 53 KG/M2 | TEMPERATURE: 98.6 F | WEIGHT: 288 LBS | RESPIRATION RATE: 20 BRPM | HEIGHT: 62 IN

## 2019-06-03 PROBLEM — G47.33 OBSTRUCTIVE SLEEP APNEA (ADULT) (PEDIATRIC): Chronic | Status: ACTIVE | Noted: 2019-05-13

## 2019-06-03 PROBLEM — R00.0 TACHYCARDIA, UNSPECIFIED: Chronic | Status: ACTIVE | Noted: 2019-05-13

## 2019-06-03 PROBLEM — E66.01 MORBID (SEVERE) OBESITY DUE TO EXCESS CALORIES: Chronic | Status: ACTIVE | Noted: 2019-05-13

## 2019-06-03 PROBLEM — K25.9 GASTRIC ULCER, UNSPECIFIED AS ACUTE OR CHRONIC, WITHOUT HEMORRHAGE OR PERFORATION: Chronic | Status: ACTIVE | Noted: 2019-05-13

## 2019-06-03 PROBLEM — I10 ESSENTIAL (PRIMARY) HYPERTENSION: Chronic | Status: ACTIVE | Noted: 2019-05-13

## 2019-06-03 PROCEDURE — 99024 POSTOP FOLLOW-UP VISIT: CPT

## 2019-06-15 DIAGNOSIS — R73.09 OTHER ABNORMAL GLUCOSE: ICD-10-CM

## 2019-06-17 PROBLEM — R73.09 ELEVATED GLUCOSE LEVEL: Status: ACTIVE | Noted: 2019-06-17

## 2019-07-09 ENCOUNTER — APPOINTMENT (OUTPATIENT)
Dept: SURGERY | Facility: CLINIC | Age: 39
End: 2019-07-09
Payer: MEDICAID

## 2019-07-09 VITALS
HEART RATE: 98 BPM | DIASTOLIC BLOOD PRESSURE: 94 MMHG | OXYGEN SATURATION: 99 % | WEIGHT: 275 LBS | BODY MASS INDEX: 50.61 KG/M2 | RESPIRATION RATE: 19 BRPM | SYSTOLIC BLOOD PRESSURE: 138 MMHG | HEIGHT: 62 IN | TEMPERATURE: 97.9 F

## 2019-07-09 PROCEDURE — 99024 POSTOP FOLLOW-UP VISIT: CPT

## 2019-07-10 PROCEDURE — 86900 BLOOD TYPING SEROLOGIC ABO: CPT

## 2019-07-10 PROCEDURE — C1889: CPT

## 2019-07-10 PROCEDURE — 86901 BLOOD TYPING SEROLOGIC RH(D): CPT

## 2019-07-10 PROCEDURE — 88305 TISSUE EXAM BY PATHOLOGIST: CPT

## 2019-07-10 PROCEDURE — 84100 ASSAY OF PHOSPHORUS: CPT

## 2019-07-10 PROCEDURE — 82962 GLUCOSE BLOOD TEST: CPT

## 2019-07-10 PROCEDURE — 85027 COMPLETE CBC AUTOMATED: CPT

## 2019-07-10 PROCEDURE — 81025 URINE PREGNANCY TEST: CPT

## 2019-07-10 PROCEDURE — 83735 ASSAY OF MAGNESIUM: CPT

## 2019-07-10 PROCEDURE — 80048 BASIC METABOLIC PNL TOTAL CA: CPT

## 2019-08-08 ENCOUNTER — APPOINTMENT (OUTPATIENT)
Dept: CARDIOLOGY | Facility: CLINIC | Age: 39
End: 2019-08-08

## 2019-08-15 ENCOUNTER — APPOINTMENT (OUTPATIENT)
Dept: SURGERY | Facility: CLINIC | Age: 39
End: 2019-08-15

## 2019-08-15 ENCOUNTER — APPOINTMENT (OUTPATIENT)
Dept: CARDIOLOGY | Facility: CLINIC | Age: 39
End: 2019-08-15
Payer: MEDICAID

## 2019-08-15 ENCOUNTER — NON-APPOINTMENT (OUTPATIENT)
Age: 39
End: 2019-08-15

## 2019-08-15 VITALS — HEART RATE: 76 BPM | BODY MASS INDEX: 48.21 KG/M2 | WEIGHT: 262 LBS | HEIGHT: 62 IN | OXYGEN SATURATION: 99 %

## 2019-08-15 VITALS — DIASTOLIC BLOOD PRESSURE: 74 MMHG | SYSTOLIC BLOOD PRESSURE: 124 MMHG

## 2019-08-15 PROCEDURE — 93000 ELECTROCARDIOGRAM COMPLETE: CPT

## 2019-08-15 PROCEDURE — 93306 TTE W/DOPPLER COMPLETE: CPT

## 2019-08-15 PROCEDURE — 99213 OFFICE O/P EST LOW 20 MIN: CPT

## 2019-08-15 NOTE — DISCUSSION/SUMMARY
[FreeTextEntry1] : In a summary Christelle Smith is a young female with hypertension, controlled. Continue current medications and healthy diet. Echo done showed normal LV systolic function. Heart rate improved significantly after Surgery. Continue healthy lifestyle. Follow up in 6 months.

## 2019-08-15 NOTE — PHYSICAL EXAM
[General Appearance - Well Developed] : well developed [Normal Appearance] : normal appearance [Well Groomed] : well groomed [General Appearance - In No Acute Distress] : no acute distress [No Deformities] : no deformities [General Appearance - Well Nourished] : well nourished [Normal Conjunctiva] : the conjunctiva exhibited no abnormalities [Normal Oral Mucosa] : normal oral mucosa [Eyelids - No Xanthelasma] : the eyelids demonstrated no xanthelasmas [No Oral Cyanosis] : no oral cyanosis [No Oral Pallor] : no oral pallor [Auscultation Breath Sounds / Voice Sounds] : lungs were clear to auscultation bilaterally [Respiration, Rhythm And Depth] : normal respiratory rhythm and effort [Heart Rate And Rhythm] : heart rate and rhythm were normal [Heart Sounds] : normal S1 and S2 [Murmurs] : no murmurs present [Arterial Pulses Normal] : the arterial pulses were normal [Bowel Sounds] : normal bowel sounds [Edema] : no peripheral edema present [Abdomen Soft] : soft [Abdomen Tenderness] : non-tender [Abnormal Walk] : normal gait [Nail Clubbing] : no clubbing of the fingernails [Cyanosis, Localized] : no localized cyanosis [Skin Color & Pigmentation] : normal skin color and pigmentation [] : no rash [Skin Turgor] : normal skin turgor [Impaired Insight] : insight and judgment were intact [Oriented To Time, Place, And Person] : oriented to person, place, and time [No Anxiety] : not feeling anxious [FreeTextEntry1] : No JVD

## 2019-08-15 NOTE — HISTORY OF PRESENT ILLNESS
[FreeTextEntry1] : Christelle Smith is a 38 year old female with hypertension, morbid obesity s/p sleeve gastrectomy lost about 85 lbs so far comes for follow up visit. Denies any chest pain or palpitations. Shortness of breath on exertion improved significantly. Compliant to medications and diet.

## 2019-08-15 NOTE — REVIEW OF SYSTEMS
[Negative] : Psychiatric [Seeing Double (Diplopia)] : no diplopia [Blurry Vision] : no blurred vision [Eye Pain] : no eye pain [Eyeglasses] : not currently wearing eyeglasses [Shortness Of Breath] : no shortness of breath [Chest Pain] : no chest pain [Lower Ext Edema] : no extremity edema [Palpitations] : no palpitations [Cough] : no cough [Wheezing] : no wheezing [Coughing Up Blood] : no hemoptysis [Abdominal Pain] : no abdominal pain [Nausea] : no nausea [Heartburn] : no heartburn [Vomiting] : no vomiting [Change in Appetite] : no change in appetite [Change In The Stool] : no change in stool [Easy Bleeding] : no tendency for easy bleeding [Dysphagia] : no dysphagia [Easy Bruising] : no tendency for easy bruising

## 2019-09-17 ENCOUNTER — APPOINTMENT (OUTPATIENT)
Dept: SURGERY | Facility: CLINIC | Age: 39
End: 2019-09-17
Payer: MEDICAID

## 2019-09-17 VITALS
HEIGHT: 62 IN | DIASTOLIC BLOOD PRESSURE: 93 MMHG | OXYGEN SATURATION: 99 % | TEMPERATURE: 99 F | WEIGHT: 247 LBS | RESPIRATION RATE: 18 BRPM | HEART RATE: 92 BPM | SYSTOLIC BLOOD PRESSURE: 141 MMHG | BODY MASS INDEX: 45.45 KG/M2

## 2019-09-17 PROCEDURE — 99213 OFFICE O/P EST LOW 20 MIN: CPT

## 2020-02-05 ENCOUNTER — APPOINTMENT (OUTPATIENT)
Dept: CARDIOLOGY | Facility: CLINIC | Age: 40
End: 2020-02-05
Payer: MEDICAID

## 2020-02-05 ENCOUNTER — NON-APPOINTMENT (OUTPATIENT)
Age: 40
End: 2020-02-05

## 2020-02-05 VITALS
HEART RATE: 79 BPM | WEIGHT: 219 LBS | OXYGEN SATURATION: 98 % | DIASTOLIC BLOOD PRESSURE: 86 MMHG | HEIGHT: 62 IN | BODY MASS INDEX: 40.3 KG/M2 | SYSTOLIC BLOOD PRESSURE: 126 MMHG

## 2020-02-05 DIAGNOSIS — G47.33 OBSTRUCTIVE SLEEP APNEA (ADULT) (PEDIATRIC): ICD-10-CM

## 2020-02-05 DIAGNOSIS — Z99.89 OBSTRUCTIVE SLEEP APNEA (ADULT) (PEDIATRIC): ICD-10-CM

## 2020-02-05 PROCEDURE — 99214 OFFICE O/P EST MOD 30 MIN: CPT

## 2020-02-05 PROCEDURE — 93000 ELECTROCARDIOGRAM COMPLETE: CPT

## 2020-02-05 RX ORDER — METOPROLOL TARTRATE 25 MG/1
25 TABLET, FILM COATED ORAL TWICE DAILY
Qty: 60 | Refills: 3 | Status: DISCONTINUED | COMMUNITY
End: 2020-02-05

## 2020-02-05 NOTE — PHYSICAL EXAM
[General Appearance - Well Developed] : well developed [Normal Appearance] : normal appearance [Well Groomed] : well groomed [General Appearance - Well Nourished] : well nourished [General Appearance - In No Acute Distress] : no acute distress [Normal Conjunctiva] : the conjunctiva exhibited no abnormalities [No Deformities] : no deformities [Eyelids - No Xanthelasma] : the eyelids demonstrated no xanthelasmas [Normal Oral Mucosa] : normal oral mucosa [No Oral Pallor] : no oral pallor [No Oral Cyanosis] : no oral cyanosis [Respiration, Rhythm And Depth] : normal respiratory rhythm and effort [Auscultation Breath Sounds / Voice Sounds] : lungs were clear to auscultation bilaterally [Heart Rate And Rhythm] : heart rate and rhythm were normal [Heart Sounds] : normal S1 and S2 [Murmurs] : no murmurs present [Arterial Pulses Normal] : the arterial pulses were normal [Edema] : no peripheral edema present [Abdomen Soft] : soft [Bowel Sounds] : normal bowel sounds [Nail Clubbing] : no clubbing of the fingernails [Abdomen Tenderness] : non-tender [Abnormal Walk] : normal gait [Skin Turgor] : normal skin turgor [Cyanosis, Localized] : no localized cyanosis [Skin Color & Pigmentation] : normal skin color and pigmentation [Impaired Insight] : insight and judgment were intact [Oriented To Time, Place, And Person] : oriented to person, place, and time [] : no rash [No Anxiety] : not feeling anxious [FreeTextEntry1] : No JVD

## 2020-02-05 NOTE — REVIEW OF SYSTEMS
[Negative] : Psychiatric [Blurry Vision] : no blurred vision [Eye Pain] : no eye pain [Seeing Double (Diplopia)] : no diplopia [Chest Pain] : no chest pain [Shortness Of Breath] : no shortness of breath [Eyeglasses] : not currently wearing eyeglasses [Lower Ext Edema] : no extremity edema [Palpitations] : no palpitations [Cough] : no cough [Wheezing] : no wheezing [Nausea] : no nausea [Abdominal Pain] : no abdominal pain [Coughing Up Blood] : no hemoptysis [Vomiting] : no vomiting [Heartburn] : no heartburn [Dysphagia] : no dysphagia [Change In The Stool] : no change in stool [Change in Appetite] : no change in appetite [Easy Bleeding] : no tendency for easy bleeding [Easy Bruising] : no tendency for easy bruising

## 2020-02-05 NOTE — DISCUSSION/SUMMARY
[FreeTextEntry1] : In a summary Christelle Smith is a young female with hypertension, controlled. Continue current medications and 2 gm sodium diet. Obstructive sleep apnea, on CPAP. Continue healthy lifestyle. Follow up in 6 months.

## 2020-02-05 NOTE — HISTORY OF PRESENT ILLNESS
[FreeTextEntry1] : Christelle Smith is a 39 year old female with history of hypertension and sleep apnea on CPAP comes for follow up visit. Denies any chest pain or palpitations. No shortness of breath on exertion. Compliant to medications and diet. Follows up with PCP. Physically active.

## 2020-07-09 ENCOUNTER — APPOINTMENT (OUTPATIENT)
Dept: SURGERY | Facility: CLINIC | Age: 40
End: 2020-07-09
Payer: MEDICAID

## 2020-07-09 VITALS — BODY MASS INDEX: 37.91 KG/M2 | HEIGHT: 62 IN | WEIGHT: 206 LBS

## 2020-07-09 DIAGNOSIS — E66.01 MORBID (SEVERE) OBESITY DUE TO EXCESS CALORIES: ICD-10-CM

## 2020-07-09 PROCEDURE — 99212 OFFICE O/P EST SF 10 MIN: CPT | Mod: 95

## 2020-07-09 NOTE — ASSESSMENT
[___ Years Post Op] : [unfilled] years [Today's Weight: ___] : Today's weight:[unfilled] lbs [Previous Visit Weight: ___] : Previous visit weight: [unfilled] lbs [Today's BMI: ___] : Today's BMI: [unfilled] [de-identified] : Plan:\par Labs slip sent for annual blood work. \par Exercise everyday for 30 minutes\par Continue high protein, low carb meals\par Annual follow-up

## 2020-07-09 NOTE — DATA REVIEWED
[FreeTextEntry1] : 39 year old female s/p LVSG on 5/22/2019 and has lost almost 100#.\par Pt exercises and eats low carbs and high protein.

## 2020-07-09 NOTE — HISTORY OF PRESENT ILLNESS
[Procedure: ___] : Procedure performed: [unfilled]  [Date of Surgery: ___] : Date of Surgery:   [unfilled] [Pre-Op Weight ___] : Pre-op weight was [unfilled] lbs [Surgeon Name:   ___] : Surgeon Name: Dr. DEWITT [___ Years Post Op] : [unfilled] years [Phase 4] : Phase 4 [___Kcal] : [unfilled] Kcal [___gm Protein] : [unfilled] gm protein [Walking] : walking [de-identified] : Christelle is a 38 y/o female s/p Laparoscopic vertical sleeve gastrectomy and Cruroplasty on 05/22/2019. Patient was last seen 09/17/2019 - Weight 247 / BMI 45.2. \par  [de-identified] : No complaints\par Tolerating and compliant with diet\par Much more active\par No reflux [Diabetes] : no Diabetes [Hypertension] : no Hypertension [Sleep Apnea] : no Sleep Apnea [GERD] : no GERD [Hyperlipidemia] : no Hyperlipidemia

## 2020-07-09 NOTE — REASON FOR VISIT
[Gastric Sleeve] : gastric sleeve [Home] : at home, [unfilled] , at the time of the visit. [Medical Office: ([facility])___] : at the medical office located in  [Verbal consent obtained from patient] : the patient, [unfilled] [de-identified] : 05/22/2019 [de-identified] : Pt had LVSG on 5/22/2019

## 2020-08-05 ENCOUNTER — APPOINTMENT (OUTPATIENT)
Dept: CARDIOLOGY | Facility: CLINIC | Age: 40
End: 2020-08-05

## 2020-09-08 ENCOUNTER — APPOINTMENT (OUTPATIENT)
Dept: CARDIOLOGY | Facility: CLINIC | Age: 40
End: 2020-09-08
Payer: MEDICAID

## 2020-09-08 ENCOUNTER — NON-APPOINTMENT (OUTPATIENT)
Age: 40
End: 2020-09-08

## 2020-09-08 VITALS
HEIGHT: 62 IN | WEIGHT: 203 LBS | BODY MASS INDEX: 37.36 KG/M2 | HEART RATE: 78 BPM | OXYGEN SATURATION: 98 % | DIASTOLIC BLOOD PRESSURE: 86 MMHG | SYSTOLIC BLOOD PRESSURE: 126 MMHG

## 2020-09-08 PROCEDURE — 93000 ELECTROCARDIOGRAM COMPLETE: CPT

## 2020-09-08 PROCEDURE — 99214 OFFICE O/P EST MOD 30 MIN: CPT

## 2020-09-08 PROCEDURE — 93306 TTE W/DOPPLER COMPLETE: CPT

## 2020-09-08 RX ORDER — AMLODIPINE BESYLATE 5 MG/1
5 TABLET ORAL
Qty: 30 | Refills: 6 | Status: DISCONTINUED | COMMUNITY
Start: 2018-08-13 | End: 2020-09-08

## 2020-09-08 NOTE — HISTORY OF PRESENT ILLNESS
[FreeTextEntry1] : Christelle Smith is a 40 year old female with history of hypertension comes for follow up visit. Denies any chest pain or palpitations. No shortness of breath on exertion. Some times she miss taking Metoprolol. Physically active. Follows up with PCP.

## 2020-09-08 NOTE — DISCUSSION/SUMMARY
[FreeTextEntry1] : In a summary Christelle Smith is a young female with hypertension, controlled. Continue metoprolol and 2 gm sodium diet. Echo done showed normal LV systolic function. Continue healthy lifestyle. Follow up in 1 year.

## 2020-09-08 NOTE — REVIEW OF SYSTEMS
96 [Negative] : Endocrine [Blurry Vision] : no blurred vision [Seeing Double (Diplopia)] : no diplopia [Eye Pain] : no eye pain [Chest Pain] : no chest pain [Eyeglasses] : not currently wearing eyeglasses [Shortness Of Breath] : no shortness of breath [Lower Ext Edema] : no extremity edema [Wheezing] : no wheezing [Cough] : no cough [Palpitations] : no palpitations [Abdominal Pain] : no abdominal pain [Coughing Up Blood] : no hemoptysis [Nausea] : no nausea [Vomiting] : no vomiting [Heartburn] : no heartburn [Change in Appetite] : no change in appetite [Dysphagia] : no dysphagia [Change In The Stool] : no change in stool [Easy Bleeding] : no tendency for easy bleeding [Easy Bruising] : no tendency for easy bruising

## 2020-09-08 NOTE — PHYSICAL EXAM
[General Appearance - Well Developed] : well developed [Well Groomed] : well groomed [Normal Appearance] : normal appearance [General Appearance - Well Nourished] : well nourished [General Appearance - In No Acute Distress] : no acute distress [No Deformities] : no deformities [Eyelids - No Xanthelasma] : the eyelids demonstrated no xanthelasmas [Normal Conjunctiva] : the conjunctiva exhibited no abnormalities [No Oral Pallor] : no oral pallor [Normal Oral Mucosa] : normal oral mucosa [No Oral Cyanosis] : no oral cyanosis [Respiration, Rhythm And Depth] : normal respiratory rhythm and effort [Heart Rate And Rhythm] : heart rate and rhythm were normal [Auscultation Breath Sounds / Voice Sounds] : lungs were clear to auscultation bilaterally [Arterial Pulses Normal] : the arterial pulses were normal [Murmurs] : no murmurs present [Heart Sounds] : normal S1 and S2 [Bowel Sounds] : normal bowel sounds [Abdomen Soft] : soft [Edema] : no peripheral edema present [Abnormal Walk] : normal gait [Nail Clubbing] : no clubbing of the fingernails [Abdomen Tenderness] : non-tender [Cyanosis, Localized] : no localized cyanosis [Skin Color & Pigmentation] : normal skin color and pigmentation [Oriented To Time, Place, And Person] : oriented to person, place, and time [] : no rash [Skin Turgor] : normal skin turgor [No Anxiety] : not feeling anxious [Impaired Insight] : insight and judgment were intact [FreeTextEntry1] : No JVD

## 2021-07-08 ENCOUNTER — APPOINTMENT (OUTPATIENT)
Dept: SURGERY | Facility: CLINIC | Age: 41
End: 2021-07-08
Payer: MEDICAID

## 2021-07-08 VITALS
TEMPERATURE: 98.7 F | HEIGHT: 62 IN | DIASTOLIC BLOOD PRESSURE: 87 MMHG | SYSTOLIC BLOOD PRESSURE: 134 MMHG | OXYGEN SATURATION: 100 % | WEIGHT: 206 LBS | BODY MASS INDEX: 37.91 KG/M2 | RESPIRATION RATE: 16 BRPM | HEART RATE: 99 BPM

## 2021-07-08 PROCEDURE — 99213 OFFICE O/P EST LOW 20 MIN: CPT

## 2021-07-08 NOTE — ASSESSMENT
[___ Years Post Op] : [unfilled] years [Previous Visit Weight: ___] : Previous visit weight: [unfilled] lbs [Today's Weight: ___] : Today's weight:[unfilled] lbs [Today's BMI: ___] : Today's BMI: [unfilled] [de-identified] : Plan:\par High protein low carb meals healthy fats as described\par Increase fluid intake\par Prenatal vitamins\par Folic acid supplements\par Check blood work\par Follow-up 6 months

## 2021-07-08 NOTE — PHYSICAL EXAM
[de-identified] : anicteric mucous membranes moist [de-identified] : CTA  [de-identified] : RRR [de-identified] : abdomen soft nontender nondistended [de-identified] : surgical incisions healed

## 2021-07-08 NOTE — DATA REVIEWED
Problem/Plan - 1:  ·  Problem: Generalized weakness.  Plan: Unknown cause .  Pt presented with generalized weakness  CT head did not show acute infarct or hemorrhage  Resolved.    Problem/Plan - 2:  ·  Problem: Herpes zoster ophthalmicus, right eye.  Plan: Resolving. Pt has erythema and scaly lesion at V1 dermatome, around right eye  Continue valacyclovir.   ID helping .   Problem/Plan - 3:  ·  Problem: Hyponatremia/ Hypokalemia .  Plan: Correcting.     Problem/Plan - 4:  ·  Problem: Schizophrenia.  Plan: Psychiatry follow up.   Continue home medications.      Problem/Plan - 5:  ·  Problem: Prophylactic measure.  Plan:  Lovenox for DVT prophylaxis.     Disposition : DC planning as Medically stable. [No studies available for review at this time.] : No studies available for review at this time.

## 2021-07-08 NOTE — HISTORY OF PRESENT ILLNESS
[Procedure: ___] : Procedure performed: [unfilled]  [Date of Surgery: ___] : Date of Surgery:   [unfilled] [Surgeon Name:   ___] : Surgeon Name: Dr. DEWITT [Pre-Op Weight ___] : Pre-op weight was [unfilled] lbs [___ Years Post Op] : [unfilled] years [Phase 4] : Phase 4 [___Kcal] : [unfilled] Kcal [___gm Protein] : [unfilled] gm protein [Walking] : walking [de-identified] : doing well no issues; patient is 14 wks pregnant\par Tolerating regular diet without difficulty\par Continuing the post gastric sleeve lifestyle with increase protein and healthy fats and limiting carbohydrates [Diabetes] : no Diabetes [Hypertension] : no Hypertension [Sleep Apnea] : no Sleep Apnea [GERD] : no GERD [Hyperlipidemia] : no Hyperlipidemia

## 2021-09-20 ENCOUNTER — APPOINTMENT (OUTPATIENT)
Dept: CARDIOLOGY | Facility: CLINIC | Age: 41
End: 2021-09-20
Payer: MEDICAID

## 2021-09-20 ENCOUNTER — NON-APPOINTMENT (OUTPATIENT)
Age: 41
End: 2021-09-20

## 2021-09-20 VITALS
OXYGEN SATURATION: 98 % | SYSTOLIC BLOOD PRESSURE: 108 MMHG | HEIGHT: 62 IN | DIASTOLIC BLOOD PRESSURE: 72 MMHG | HEART RATE: 77 BPM | BODY MASS INDEX: 39.75 KG/M2 | TEMPERATURE: 97.7 F | WEIGHT: 216 LBS

## 2021-09-20 DIAGNOSIS — I10 ESSENTIAL (PRIMARY) HYPERTENSION: ICD-10-CM

## 2021-09-20 PROCEDURE — 93000 ELECTROCARDIOGRAM COMPLETE: CPT

## 2021-09-20 PROCEDURE — 93306 TTE W/DOPPLER COMPLETE: CPT

## 2021-09-20 PROCEDURE — 99213 OFFICE O/P EST LOW 20 MIN: CPT

## 2021-09-20 RX ORDER — METOPROLOL SUCCINATE 25 MG/1
25 TABLET, EXTENDED RELEASE ORAL DAILY
Qty: 30 | Refills: 6 | Status: DISCONTINUED | COMMUNITY
Start: 2020-02-05 | End: 2021-09-20

## 2021-09-20 RX ORDER — FOLIC ACID 20 MG
CAPSULE ORAL
Refills: 0 | Status: ACTIVE | COMMUNITY

## 2021-09-20 NOTE — PHYSICAL EXAM
[Normal Conjunctiva] : normal conjunctiva [No Carotid Bruit] : no carotid bruit [Normal Bowel Sounds] : normal bowel sounds [Normal] : alert and oriented, normal memory [de-identified] : 5 months pregnant.

## 2021-09-20 NOTE — HISTORY OF PRESENT ILLNESS
[FreeTextEntry1] : Christelle Smith is a 41 year old with hypertension and 5 months pregnant comes for follow up visit. Denies any chest pain or palpitations. No Shortness of breath on exertion. BP controlled with diet and losing weight. Not taking Metoprolol any more. Physically active.

## 2021-09-20 NOTE — REVIEW OF SYSTEMS
[Negative] : Respiratory [Blurry Vision] : no blurred vision [Dyspnea on exertion] : not dyspnea during exertion [Chest Discomfort] : no chest discomfort [Lower Ext Edema] : no extremity edema [Palpitations] : no palpitations [Orthopnea] : no orthopnea [PND] : no PND [Nausea] : no nausea [Abdominal Pain] : no abdominal pain [Vomiting] : no vomiting [Heartburn] : no heartburn [Joint Pain] : no joint pain [Rash] : no rash [Itching] : no itching [Dizziness] : no dizziness [Tremor] : no tremor was seen [Numbness (Hypoesthesia)] : no numbness [Tingling (Paresthesia)] : no tingling [Confusion] : no confusion was observed [Anxiety] : no anxiety [Under Stress] : not under stress [Easy Bleeding] : no tendency for easy bleeding [Easy Bruising] : no tendency for easy bruising

## 2021-09-20 NOTE — DISCUSSION/SUMMARY
[FreeTextEntry1] : In a summary Christelle Smith is a middle aged female with 5 months pregnant and hypertension diet controlled. Echo done showed normal LV systolic function and wall motion. Continue healthy lifestyle.

## 2022-01-25 NOTE — PATIENT PROFILE ADULT - ARE SIGNIFICANT INDICATORS COMPLETE.
Patient stopped in at [facility] they came from [facility]  Patient has a fx finger  No Yes

## 2022-05-12 ENCOUNTER — APPOINTMENT (OUTPATIENT)
Dept: SURGERY | Facility: CLINIC | Age: 42
End: 2022-05-12
Payer: MEDICAID

## 2022-05-12 VITALS
OXYGEN SATURATION: 99 % | WEIGHT: 222 LBS | BODY MASS INDEX: 40.85 KG/M2 | SYSTOLIC BLOOD PRESSURE: 110 MMHG | DIASTOLIC BLOOD PRESSURE: 74 MMHG | HEART RATE: 67 BPM | HEIGHT: 62 IN | TEMPERATURE: 98.5 F | RESPIRATION RATE: 18 BRPM

## 2022-05-12 VITALS — BODY MASS INDEX: 40.85 KG/M2 | HEIGHT: 62 IN | WEIGHT: 222 LBS

## 2022-05-12 PROCEDURE — 99212 OFFICE O/P EST SF 10 MIN: CPT

## 2022-05-12 NOTE — ASSESSMENT
[___ Years Post Op] : [unfilled] years [Previous Visit Weight: ___] : Previous visit weight: [unfilled] lbs [Cormobidities: ___] : Comorbidities: [unfilled] [Today's Weight: ___] : Today's weight:[unfilled] lbs [Today's BMI: ___] : Today's BMI: [unfilled] [de-identified] : Plan:\par Check labs\par Take vitamins\par Decrease carbohydrates to less than 25 g a day\par 600 to 800-calorie 60 to 80 g diet\par Increase activities as tolerated\par Follow-up 3 months

## 2022-05-12 NOTE — REASON FOR VISIT
[Gastric Sleeve] : gastric sleeve [de-identified] : 5/22/2019 [de-identified] : DOLV: 7/8/2021.

## 2022-05-12 NOTE — PHYSICAL EXAM
[de-identified] : anicteric, mucous membranes moist  [de-identified] : CTA  [de-identified] : RRR [de-identified] : abdomen soft nontender nondistended  [de-identified] : healed

## 2022-05-12 NOTE — HISTORY OF PRESENT ILLNESS
[Procedure: ___] : Procedure performed: [unfilled]  [Date of Surgery: ___] : Date of Surgery:   [unfilled] [Surgeon Name:   ___] : Surgeon Name: Dr. DEWITT [Pre-Op Weight ___] : Pre-op weight was [unfilled] lbs [___ Years Post Op] : [unfilled] years [Hypertension] : Hypertension [Phase 4] : Phase 4 [___Kcal] : [unfilled] Kcal [___gm Protein] : [unfilled] gm protein [Walking] : walking [de-identified] : postpartum 5 months; emergency - gained 30#\par Looking for some help to reduce her weight\par Not breast-feeding [Diabetes] : no Diabetes [Sleep Apnea] : no Sleep Apnea [GERD] : no GERD [Hyperlipidemia] : no Hyperlipidemia

## 2022-06-20 ENCOUNTER — NON-APPOINTMENT (OUTPATIENT)
Age: 42
End: 2022-06-20

## 2022-09-28 DIAGNOSIS — Z13.21 ENCOUNTER FOR SCREENING FOR NUTRITIONAL DISORDER: ICD-10-CM

## 2022-09-29 ENCOUNTER — APPOINTMENT (OUTPATIENT)
Dept: SURGERY | Facility: CLINIC | Age: 42
End: 2022-09-29

## 2022-09-29 VITALS — BODY MASS INDEX: 39.38 KG/M2 | WEIGHT: 214 LBS | HEIGHT: 62 IN

## 2022-09-29 PROCEDURE — 99213 OFFICE O/P EST LOW 20 MIN: CPT | Mod: 95

## 2022-09-29 NOTE — ASSESSMENT
[___ Years Post Op] : [unfilled] years [Previous Visit Weight: ___] : Previous visit weight: [unfilled] lbs [Cormobidities: ___] : Comorbidities: [unfilled] [de-identified] : Plan:\par Labs mailed to pt's home address\par Continue vitamin avoid starch and sweets\par Increase protein intake\par Iron infusion per PMD\par

## 2022-09-29 NOTE — HISTORY OF PRESENT ILLNESS
[Procedure: ___] : Procedure performed: [unfilled]  [Date of Surgery: ___] : Date of Surgery:   [unfilled] [Surgeon Name:   ___] : Surgeon Name: Dr. DEWITT [Pre-Op Weight ___] : Pre-op weight was [unfilled] lbs [___ Years Post Op] : [unfilled] years [Hypertension] : Hypertension [de-identified] : No complaints\par Tolerating diet\par Wants to lose more weight\par No reflux [Diabetes] : no Diabetes [Sleep Apnea] : no Sleep Apnea [Hyperlipidemia] : no Hyperlipidemia

## 2022-09-29 NOTE — REASON FOR VISIT
[Home] : at home, [unfilled] , at the time of the visit. [Medical Office: ([facility])___] : at the medical office located in  [Patient] : the patient [Self] : self [Gastric Sleeve] : gastric sleeve [FreeTextEntry2] : Christelle Sheth  [de-identified] : 5/22/2019  [de-identified] : DOLV: 5/12/2022

## 2023-12-06 ENCOUNTER — NON-APPOINTMENT (OUTPATIENT)
Age: 43
End: 2023-12-06

## 2024-05-16 ENCOUNTER — APPOINTMENT (OUTPATIENT)
Dept: SURGERY | Facility: CLINIC | Age: 44
End: 2024-05-16
Payer: MEDICAID

## 2024-05-16 VITALS
HEART RATE: 90 BPM | BODY MASS INDEX: 40.94 KG/M2 | DIASTOLIC BLOOD PRESSURE: 65 MMHG | TEMPERATURE: 97.3 F | SYSTOLIC BLOOD PRESSURE: 106 MMHG | OXYGEN SATURATION: 99 % | HEIGHT: 62 IN | WEIGHT: 222.5 LBS | RESPIRATION RATE: 16 BRPM

## 2024-05-16 DIAGNOSIS — Z98.84 BARIATRIC SURGERY STATUS: ICD-10-CM

## 2024-05-16 DIAGNOSIS — R63.5 ABNORMAL WEIGHT GAIN: ICD-10-CM

## 2024-05-16 PROCEDURE — 99213 OFFICE O/P EST LOW 20 MIN: CPT

## 2024-05-16 NOTE — PLAN
[FreeTextEntry1] : Plan: high-protein low-carb diet 600 to 800 cori/day Less than 25 g of carbohydrates per day Dietary counseling provided Patient to see practice RD Patient to see practice NP for possible weight loss medication. Will also check upper GI for possible anatomic cause for her weight gain Increase activities as tolerated Check blood work Continue vitamins daily Follow-up 3 months

## 2024-05-16 NOTE — PHYSICAL EXAM
[Normal] : affect appropriate [de-identified] : Normoactive bowel sounds, no hepatosplenomegaly, no masses, non-tender.

## 2024-05-16 NOTE — ASSESSMENT
[FreeTextEntry1] : Today's weight: 222.5 BMI: 40.7  43-year-old woman with some weight regain from her magalys status post sleeve gastrectomy in May 2019.  Otherwise doing well.

## 2024-05-16 NOTE — HISTORY OF PRESENT ILLNESS
[de-identified] : Christelle is a 42 y/o female here for a year follow up.  DOLV: 09/29/22  s/p Laparoscopic vertical sleeve gastrectomy & cruroplasty 5/22/19  Today pt reports intermittent lower abdominal pain and pulling sensations for about 1 year.   Is tolerating regular diet. Drinks 60 oz of water along with protein shakes.  Taking daily vitamins.  Ambulating activity are done.   Maintaining a high protein, low carb diet, although carbohydrates on occasion are increased. Denies nausea/vomiting. Does get occasional acid reflux. Denies fevers/chills. Normal daily BM.

## 2024-05-16 NOTE — REVIEW OF SYSTEMS
[Abdominal Pain] : abdominal pain [Reflux/Heartburn] : reflux/heartburn [Negative] : Allergic/Immunologic [FreeTextEntry7] : lower abdominal pain

## 2024-06-11 NOTE — H&P PST ADULT - NSANTHOSAYNRD_GEN_A_CORE
Detail Level: Zone Add High Risk Medication Management Associated Diagnosis?: No Length Of Therapy: 2.5 years Yes

## 2024-07-16 ENCOUNTER — APPOINTMENT (OUTPATIENT)
Dept: SURGERY | Facility: CLINIC | Age: 44
End: 2024-07-16
Payer: MEDICAID

## 2024-07-16 VITALS — WEIGHT: 228 LBS | HEIGHT: 62 IN | BODY MASS INDEX: 41.96 KG/M2

## 2024-07-16 PROCEDURE — 99203 OFFICE O/P NEW LOW 30 MIN: CPT | Mod: 95

## 2024-07-18 ENCOUNTER — APPOINTMENT (OUTPATIENT)
Dept: SURGERY | Facility: CLINIC | Age: 44
End: 2024-07-18

## 2024-07-18 PROCEDURE — 97802 MEDICAL NUTRITION INDIV IN: CPT | Mod: 95

## 2024-09-11 ENCOUNTER — NON-APPOINTMENT (OUTPATIENT)
Age: 44
End: 2024-09-11

## 2024-09-11 NOTE — HISTORY OF PRESENT ILLNESS
[Procedure: ___] : Procedure performed: [unfilled]  [Date of Surgery: ___] : Date of Surgery:   [unfilled] [Surgeon Name:   ___] : Surgeon Name: Dr. DEWITT [___ Years Post Op] : [unfilled] years [de-identified] : UGI??? Today pt reports no pain.  Is tolerating regular diet. Drinks 60 oz of water along with protein shakes.  Taking daily vitamins.  Ambulating activities are done.   Maintaining a high protein, low carb diet, although carbohydrates on occasion are increased. Denies nausea/vomiting. Does get occasional acid reflux. Denies fevers/chills. Normal daily BM.  [de-identified] : DOLV: 5/16/24

## 2024-09-11 NOTE — PLAN
- Post Anesthesia Evaluation


Patient Participated: Yes


Airway Patent: Yes


Stable Respiratory Function: Yes


Nausea/Vomiting: No


Temp > 96.8F: Yes


Pain Manageable: Yes


Adequeate Hydration: Yes


Anesthesia Complications: No


Block Receding Appropriately: Yes


Patient on Ventilator: No
[FreeTextEntry1] : Plan: high-protein low-carb diet 600 to 800 cori/day Less than 25 g of carbohydrates per day Dietary counseling provided Patient to see practice RD [id number] not covered by her insurance - not a good candidate for Phentermine Increase activities as tolerated Continue vitamins daily Follow-up 3 months

## 2024-09-11 NOTE — ASSESSMENT
[___ Years Post Op] : [unfilled] years [Previous Visit Weight: ___] : Previous visit weight: [unfilled] lbs [FreeTextEntry1] : 43-year-old woman with some weight regain from her magalys status post sleeve gastrectomy in May 2019.  Otherwise doing well.

## 2024-09-11 NOTE — PHYSICAL EXAM
[Normal] : affect appropriate [de-identified] : Normoactive bowel sounds, no hepatosplenomegaly, no masses, non-tender.

## 2024-09-19 ENCOUNTER — APPOINTMENT (OUTPATIENT)
Dept: SURGERY | Facility: CLINIC | Age: 44
End: 2024-09-19
Payer: MEDICAID

## 2024-09-19 VITALS
TEMPERATURE: 97 F | WEIGHT: 224 LBS | BODY MASS INDEX: 41.22 KG/M2 | HEIGHT: 62 IN | HEART RATE: 90 BPM | RESPIRATION RATE: 16 BRPM | OXYGEN SATURATION: 99 % | DIASTOLIC BLOOD PRESSURE: 90 MMHG | SYSTOLIC BLOOD PRESSURE: 145 MMHG

## 2024-09-19 DIAGNOSIS — Z98.84 BARIATRIC SURGERY STATUS: ICD-10-CM

## 2024-09-19 DIAGNOSIS — R63.5 ABNORMAL WEIGHT GAIN: ICD-10-CM

## 2024-09-19 DIAGNOSIS — E66.01 MORBID (SEVERE) OBESITY DUE TO EXCESS CALORIES: ICD-10-CM

## 2024-09-19 PROCEDURE — 99214 OFFICE O/P EST MOD 30 MIN: CPT

## 2024-09-19 NOTE — PLAN
[FreeTextEntry1] : Plan: Revisional weight loss surgery. The risks, benefits and alternatives, continued medical management versus including laparoscopic gastric bypass were discussed at length and all of her questions were answered. The patient appears to understand and wishes to proceed.   The patient was given the following instructions:   1. She needs a complete medical evaluation cardiac and pulmonary which may include echocardiogram, stress test, pulmonary function test and evaluation for CPAP for sleep apnea.  2. She needs an upper endoscopy.   3. She needs dietary and psychological evaluations.   4. She must attend a preoperative support group meeting.   5. She must undergo a right upper quadrant ultrasound.   The patient clearly understood that surgery would only be scheduled if there are no medical or psychiatric contraindications, and a follow-up office visit is required.

## 2024-09-19 NOTE — PHYSICAL EXAM
[Obese, well nourished, in no acute distress] : obese, well nourished, in no acute distress [de-identified] : Normoactive bowel sounds, no hepatosplenomegaly, no masses, non-tender.  Healed incisions consistent with diagnosis of history of sleeve gastrectomy

## 2024-09-19 NOTE — HISTORY OF PRESENT ILLNESS
[de-identified] : Patient returns to discuss conversion to bypass 2/2 weight regain. Attributes most of her weight gain after the birth of her son who is not almost 3. Denies nausea/vomiting. Does get occasional acid reflux. Denies fevers/chills. Normal daily BM.   Current BMI = 40.9 Current weight: 224 lbs  PMHx: HTN   [de-identified] : DOLV: 5/16/24

## 2024-09-19 NOTE — PLAN
[FreeTextEntry1] : Plan: Revisional weight loss surgery. The risks, benefits and alternatives, continued medical management versus including laparoscopic gastric bypass were discussed at length and all of her questions were answered. The patient appears to understand and wishes to proceed.   The patient was given the following instructions:   1. She needs a complete medical evaluation cardiac and pulmonary which may include echocardiogram, stress test, pulmonary function test and evaluation for CPAP for sleep apnea.  2. She needs an upper endoscopy.   3. She needs dietary and psychological evaluations.   4. She must attend a preoperative support group meeting.   5. She must undergo a right upper quadrant ultrasound.   The patient clearly understood that surgery would only be scheduled if there are no medical or psychiatric contraindications, and a follow-up office visit is required.  [place] website --- [email or web address] Website --- [email or web address]

## 2024-09-19 NOTE — PHYSICAL EXAM
[Obese, well nourished, in no acute distress] : obese, well nourished, in no acute distress [de-identified] : Normoactive bowel sounds, no hepatosplenomegaly, no masses, non-tender.  Healed incisions consistent with diagnosis of history of sleeve gastrectomy

## 2024-09-19 NOTE — ASSESSMENT
[FreeTextEntry1] : 43-year-old woman with weight recurrence from her magalys status post sleeve gastrectomy in May 2019.   Desiring conversion to RYGB 2/2 same.

## 2024-09-19 NOTE — HISTORY OF PRESENT ILLNESS
[de-identified] : Patient returns to discuss conversion to bypass 2/2 weight regain. Attributes most of her weight gain after the birth of her son who is not almost 3. Denies nausea/vomiting. Does get occasional acid reflux. Denies fevers/chills. Normal daily BM.   Current BMI = 40.9 Current weight: 224 lbs  PMHx: HTN   [de-identified] : DOLV: 5/16/24

## 2024-09-19 NOTE — REASON FOR VISIT
[Morbid Obesity (BMI>40)] : morbid obesity (bmi>40) [S/P Bariatric Surgery] : s/p bariatric surgery [FreeTextEntry2] : Weight gain

## 2024-09-24 ENCOUNTER — APPOINTMENT (OUTPATIENT)
Dept: PSYCHIATRY | Facility: CLINIC | Age: 44
End: 2024-09-24
Payer: MEDICAID

## 2024-09-24 VITALS — WEIGHT: 222 LBS | BODY MASS INDEX: 40.85 KG/M2 | HEIGHT: 62 IN

## 2024-09-24 DIAGNOSIS — Z78.9 OTHER SPECIFIED HEALTH STATUS: ICD-10-CM

## 2024-09-24 PROCEDURE — 90791 PSYCH DIAGNOSTIC EVALUATION: CPT | Mod: 95

## 2024-09-24 NOTE — REASON FOR VISIT
[Home] : at home, [unfilled] , at the time of the visit. [Other Location: e.g. Home (Enter Location, City,State)___] : at [unfilled] [Patient] : the patient [Initial Consult] : an initial consult for [Morbid Obesity (BMI>40)] : morbid obesity (bmi>40) [Referring By:  ___] : ~Arvind Ln~ was referred for psychological evaluation by Dr. DEWITT [Attempted Weight Loss] : attempted weight loss [Commitment to Modified Lifestyle] : commitment to a modified lifestyle pre and post surgery [Difficulties with Diet Compliance] : difficulties with diet compliance  [Expectations of Outcome] : expectations of outcome [Motivation for Selecting Surgery] : motivation for selecting surgery [Strength of Social Support System] : strength of social support system [Patient Understands Data May be Shared] : patient understands that the information discussed during the evaluation would be shared with referring provider and possibly with ~his/her~ insurance provider [de-identified] : for evaluation of psychological appropriateness for bariatric surgery      [de-identified] : confidentiality and its limits were discussed

## 2024-09-24 NOTE — HISTORY OF PRESENT ILLNESS
[Genetics] : genetics [Poor Choices] : poor choices [Large Portions] : large portions [Decrease Activity] : decrease activity [de-identified] : Pt's motivation for surgery is to improve her health and overall quality of life, increase physical activity, reduce obesity related comorbidities, including HTN. Per pt, her highest weight was 350 lbs before gastric sleeve in 2019 and her lowest weight was 195 lbs after the gastric sleeve. Her stated goal weight is 150 lbs and she expresses intent to make behavioral and dietary changes towards obtaining optimal results. [de-identified] : Gastric Bypass [de-identified] : Discussions with surgeon; watched videos; pt had a previous bariatric surgery [de-identified] : none.  Pt denies ED hx and bxs, including binge eating.   [de-identified] : A current typical day of eating is reported as follows: B: 9 am Protein shake, coffee L: 2 pm Soup S: 5 pm protein truffle with an egg D: chicken wings or soup Drinks: Water, sometimes with flavor pack,  [de-identified] : Gastric sleeve, working out, treadmill, reducing carbohydrates, reducing snacking, increasing activity. Despite multiple attempts at diet and exercise modifications, patient reports inability to maintain weight loss.

## 2024-09-24 NOTE — CURRENT PSYCHIATRIC SYMPTOMS
[de-identified] :  Pt denied current or past symptoms of a depressive disorder.  [de-identified] :  Pt denied current or past symptoms of eli. [de-identified] : no delusions, no visual hallucinations, no auditory hallucinations and a thought disorder was not noted. No evidence of psychosis. [de-identified] :  Pt denied current or past symptoms of an anxiety disorder. [de-identified] : denied [de-identified] : denied [FreeTextEntry1] : Pt denied any history of psychiatric symptoms or treatment. She denied ever attending or being in need of psychotherapy, denied ever taking psychotropic medication and denied ever being hospitalized for a psychiatric reason.

## 2024-09-24 NOTE — DISCUSSION/SUMMARY
[Behavior plan developed] : Behavior plan developed [Strategies to improve adherence identified] : Strategies to improve adherence identified [Develop and refine illness management to behavior plan] : Develop and refine illness management to behavior plan [Identify, practice refine strategies to promote adherence to regimen] : Identify, practice refine strategies to promote adherence to regimen [FreeTextEntry1] : Based on the information presented in this assessment, Ms. DO does not have any current psychological contraindications for bariatric surgery. She is cleared for surgery from a psychological perspective. [de-identified] : Psychological factors (overeating, poor dietary choices) affecting other medical conditions (obesity and associated medical sequelae). Obesity [FreeTextEntry3] : Behavioral strategies were discussed to increase her coping skills and assist her in making lifestyle modifications. Provided psychoeducational materials on changing eating behaviors in preparation for surgery. It was recommended that she attend the post-surgery group sessions for further education and support to assist her in making lifestyle changes. Additionally, it was recommended that she utilize writer and RD as needed to assist in making pre-surgical and post-surgical dietary and behavioral changes. [FreeTextEntry5] : compliance with behavioral and dietary recommendations [FreeTextEntry6] : reduction of obesity related co-morbidities   [FreeTextEntry9] : increased physical activity

## 2024-09-24 NOTE — PHYSICAL EXAM
[Normal] : good [AH] : no auditory hallucinations [VH] : no visual hallucinations [Suicidal Ideation] : no suicidal ideation [Homicidal Ideation] : no homicidal ideation [FreeTextEntry1] : normal appearance, well groomed, well nourished, obese [FreeTextEntry8] : "great"

## 2024-09-24 NOTE — SOCIAL HISTORY
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  [FreeTextEntry4] : Was in school until age 15 in [employer], per patient it's the 10th grade equivalent

## 2024-09-25 ENCOUNTER — APPOINTMENT (OUTPATIENT)
Dept: SURGERY | Facility: CLINIC | Age: 44
End: 2024-09-25
Payer: MEDICAID

## 2024-09-25 PROCEDURE — 97802 MEDICAL NUTRITION INDIV IN: CPT | Mod: 95

## 2024-09-27 LAB
25(OH)D3 SERPL-MCNC: 37.9 NG/ML
ALBUMIN SERPL ELPH-MCNC: 4.4 G/DL
ALP BLD-CCNC: 44 U/L
ALT SERPL-CCNC: 10 U/L
ANION GAP SERPL CALC-SCNC: 16 MMOL/L
APTT BLD: 36.3 SEC
AST SERPL-CCNC: 13 U/L
BILIRUB SERPL-MCNC: 0.2 MG/DL
BUN SERPL-MCNC: 17 MG/DL
CALCIUM SERPL-MCNC: 9.8 MG/DL
CHLORIDE SERPL-SCNC: 104 MMOL/L
CO2 SERPL-SCNC: 21 MMOL/L
CREAT SERPL-MCNC: 0.51 MG/DL
EGFR: 119 ML/MIN/1.73M2
ESTIMATED AVERAGE GLUCOSE: 97 MG/DL
FOLATE SERPL-MCNC: 7.9 NG/ML
GLUCOSE SERPL-MCNC: 81 MG/DL
HBA1C MFR BLD HPLC: 5 %
HCG SERPL QL: NEGATIVE
HCT VFR BLD CALC: 40.3 %
HGB BLD-MCNC: 12.5 G/DL
INR PPP: 1.04 RATIO
MCHC RBC-ENTMCNC: 25.8 PG
MCHC RBC-ENTMCNC: 31 GM/DL
MCV RBC AUTO: 83.1 FL
PAPP-A SERPL-ACNC: <1 MIU/ML
PLATELET # BLD AUTO: 301 K/UL
POTASSIUM SERPL-SCNC: 4.1 MMOL/L
PROT SERPL-MCNC: 7.5 G/DL
PT BLD: 12.3 SEC
RBC # BLD: 4.85 M/UL
RBC # FLD: 15.4 %
SODIUM SERPL-SCNC: 142 MMOL/L
TSH SERPL-ACNC: 1.12 UIU/ML
VIT B12 SERPL-MCNC: 506 PG/ML
WBC # FLD AUTO: 7.34 K/UL

## 2024-10-01 ENCOUNTER — APPOINTMENT (OUTPATIENT)
Dept: CARDIOLOGY | Facility: CLINIC | Age: 44
End: 2024-10-01
Payer: MEDICAID

## 2024-10-01 ENCOUNTER — RESULT REVIEW (OUTPATIENT)
Age: 44
End: 2024-10-01

## 2024-10-01 ENCOUNTER — NON-APPOINTMENT (OUTPATIENT)
Age: 44
End: 2024-10-01

## 2024-10-01 VITALS
RESPIRATION RATE: 16 BRPM | TEMPERATURE: 98.4 F | WEIGHT: 224 LBS | SYSTOLIC BLOOD PRESSURE: 100 MMHG | BODY MASS INDEX: 41.22 KG/M2 | DIASTOLIC BLOOD PRESSURE: 68 MMHG | HEIGHT: 62 IN | HEART RATE: 96 BPM | OXYGEN SATURATION: 99 %

## 2024-10-01 DIAGNOSIS — Z01.810 ENCOUNTER FOR PREPROCEDURAL CARDIOVASCULAR EXAMINATION: ICD-10-CM

## 2024-10-01 DIAGNOSIS — R06.02 SHORTNESS OF BREATH: ICD-10-CM

## 2024-10-01 DIAGNOSIS — I10 ESSENTIAL (PRIMARY) HYPERTENSION: ICD-10-CM

## 2024-10-01 PROCEDURE — 93000 ELECTROCARDIOGRAM COMPLETE: CPT | Mod: NC

## 2024-10-01 PROCEDURE — 93306 TTE W/DOPPLER COMPLETE: CPT

## 2024-10-01 PROCEDURE — 99204 OFFICE O/P NEW MOD 45 MIN: CPT | Mod: 25

## 2024-10-01 RX ORDER — AMLODIPINE BESYLATE 5 MG/1
5 TABLET ORAL DAILY
Refills: 0 | Status: ACTIVE | COMMUNITY

## 2024-10-01 NOTE — HISTORY OF PRESENT ILLNESS
[FreeTextEntry1] : Richy Smith is a 44- year- old female with Hypertension and sleeve gastrectomy several years ago comes for pre- op cardiac evaluation for gastric bypass surgery. Denies any chest pain or palpitations. Mild shortness of breath on climbing stairs which is relieved with rest in few minutes. Physically active. Compliant to medication.

## 2024-10-01 NOTE — DISCUSSION/SUMMARY
[FreeTextEntry1] : In a summary Richy Smith is a middle- aged- female with Hypertension, controlled. Continue Amlodipine and 2 gm sodium diet. Hypertension and shortness of breath, Echo done showed normal LV systolic function. Pre-op and shortness of breath, Exercise stress test to rule out ischemia. Further plan based on stress test results. Spent 50 minutes on encounter.

## 2024-10-01 NOTE — REVIEW OF SYSTEMS
[Dyspnea on exertion] : dyspnea during exertion [Negative] : Respiratory [Blurry Vision] : no blurred vision [Chest Discomfort] : no chest discomfort [Lower Ext Edema] : no extremity edema [Palpitations] : no palpitations [Orthopnea] : no orthopnea [PND] : no PND [Abdominal Pain] : no abdominal pain [Nausea] : no nausea [Vomiting] : no vomiting [Heartburn] : no heartburn [Joint Pain] : no joint pain [Rash] : no rash [Itching] : no itching [Dizziness] : no dizziness [Tremor] : no tremor was seen [Confusion] : no confusion was observed [Anxiety] : no anxiety [Under Stress] : not under stress [Easy Bleeding] : no tendency for easy bleeding [Easy Bruising] : no tendency for easy bruising

## 2024-10-08 ENCOUNTER — APPOINTMENT (OUTPATIENT)
Dept: GASTROENTEROLOGY | Facility: CLINIC | Age: 44
End: 2024-10-08
Payer: MEDICAID

## 2024-10-08 VITALS
HEART RATE: 88 BPM | HEIGHT: 62 IN | DIASTOLIC BLOOD PRESSURE: 84 MMHG | SYSTOLIC BLOOD PRESSURE: 124 MMHG | OXYGEN SATURATION: 99 % | BODY MASS INDEX: 41.41 KG/M2 | WEIGHT: 225 LBS

## 2024-10-08 PROCEDURE — 99203 OFFICE O/P NEW LOW 30 MIN: CPT

## 2024-10-10 ENCOUNTER — RESULT REVIEW (OUTPATIENT)
Age: 44
End: 2024-10-10

## 2024-10-10 ENCOUNTER — OUTPATIENT (OUTPATIENT)
Dept: OUTPATIENT SERVICES | Facility: HOSPITAL | Age: 44
LOS: 1 days | End: 2024-10-10
Payer: MEDICAID

## 2024-10-10 ENCOUNTER — APPOINTMENT (OUTPATIENT)
Dept: CV DIAGNOSTICS | Facility: HOSPITAL | Age: 44
End: 2024-10-10

## 2024-10-10 DIAGNOSIS — R06.02 SHORTNESS OF BREATH: ICD-10-CM

## 2024-10-10 DIAGNOSIS — Z01.810 ENCOUNTER FOR PREPROCEDURAL CARDIOVASCULAR EXAMINATION: ICD-10-CM

## 2024-10-10 PROCEDURE — 93018 CV STRESS TEST I&R ONLY: CPT | Mod: GC

## 2024-10-10 PROCEDURE — 93016 CV STRESS TEST SUPVJ ONLY: CPT | Mod: GC

## 2024-10-11 ENCOUNTER — LABORATORY RESULT (OUTPATIENT)
Age: 44
End: 2024-10-11

## 2024-10-11 ENCOUNTER — APPOINTMENT (OUTPATIENT)
Dept: GASTROENTEROLOGY | Facility: CLINIC | Age: 44
End: 2024-10-11
Payer: MEDICAID

## 2024-10-11 PROCEDURE — 43239 EGD BIOPSY SINGLE/MULTIPLE: CPT

## 2024-10-18 RX ORDER — METRONIDAZOLE 250 MG/1
250 TABLET ORAL TWICE DAILY
Qty: 56 | Refills: 1 | Status: ACTIVE | COMMUNITY
Start: 2024-10-18 | End: 1900-01-01

## 2024-10-18 RX ORDER — OMEPRAZOLE 20 MG/1
20 CAPSULE, DELAYED RELEASE ORAL TWICE DAILY
Qty: 28 | Refills: 2 | Status: ACTIVE | COMMUNITY
Start: 2024-10-18 | End: 1900-01-01

## 2024-10-18 RX ORDER — CLARITHROMYCIN 500 MG/1
500 TABLET, FILM COATED ORAL
Qty: 28 | Refills: 0 | Status: ACTIVE | COMMUNITY
Start: 2024-10-18 | End: 1900-01-01

## 2024-10-23 ENCOUNTER — APPOINTMENT (OUTPATIENT)
Dept: ULTRASOUND IMAGING | Facility: HOSPITAL | Age: 44
End: 2024-10-23
Payer: MEDICAID

## 2024-10-23 ENCOUNTER — APPOINTMENT (OUTPATIENT)
Dept: RADIOLOGY | Facility: HOSPITAL | Age: 44
End: 2024-10-23
Payer: MEDICAID

## 2024-10-23 ENCOUNTER — OUTPATIENT (OUTPATIENT)
Dept: OUTPATIENT SERVICES | Facility: HOSPITAL | Age: 44
LOS: 1 days | End: 2024-10-23
Payer: MEDICAID

## 2024-10-23 DIAGNOSIS — E66.01 MORBID (SEVERE) OBESITY DUE TO EXCESS CALORIES: ICD-10-CM

## 2024-10-23 PROCEDURE — 76700 US EXAM ABDOM COMPLETE: CPT

## 2024-10-23 PROCEDURE — 76700 US EXAM ABDOM COMPLETE: CPT | Mod: 26

## 2024-10-23 PROCEDURE — 74240 X-RAY XM UPR GI TRC 1CNTRST: CPT | Mod: 26

## 2024-10-23 PROCEDURE — 74240 X-RAY XM UPR GI TRC 1CNTRST: CPT

## 2024-10-24 ENCOUNTER — APPOINTMENT (OUTPATIENT)
Dept: SURGERY | Facility: CLINIC | Age: 44
End: 2024-10-24

## 2024-10-24 ENCOUNTER — APPOINTMENT (OUTPATIENT)
Dept: SURGERY | Facility: CLINIC | Age: 44
End: 2024-10-24
Payer: MEDICAID

## 2024-10-24 VITALS
HEART RATE: 96 BPM | HEIGHT: 62 IN | DIASTOLIC BLOOD PRESSURE: 93 MMHG | BODY MASS INDEX: 41.22 KG/M2 | WEIGHT: 224 LBS | SYSTOLIC BLOOD PRESSURE: 148 MMHG | RESPIRATION RATE: 17 BRPM | TEMPERATURE: 98 F | OXYGEN SATURATION: 99 %

## 2024-10-24 DIAGNOSIS — K21.9 GASTRO-ESOPHAGEAL REFLUX DISEASE W/OUT ESOPHAGITIS: ICD-10-CM

## 2024-10-24 DIAGNOSIS — Z98.84 BARIATRIC SURGERY STATUS: ICD-10-CM

## 2024-10-24 DIAGNOSIS — K44.9 DIAPHRAGMATIC HERNIA W/OUT OBSTRUCTION OR GANGRENE: ICD-10-CM

## 2024-10-24 PROCEDURE — 99214 OFFICE O/P EST MOD 30 MIN: CPT

## 2024-10-30 ENCOUNTER — APPOINTMENT (OUTPATIENT)
Dept: SURGERY | Facility: CLINIC | Age: 44
End: 2024-10-30

## 2024-10-30 PROCEDURE — 97803 MED NUTRITION INDIV SUBSEQ: CPT | Mod: 95

## 2024-11-01 PROBLEM — N28.89 RENAL MASS, LEFT: Status: ACTIVE | Noted: 2024-11-01

## 2024-11-05 ENCOUNTER — APPOINTMENT (OUTPATIENT)
Dept: GASTROENTEROLOGY | Facility: CLINIC | Age: 44
End: 2024-11-05

## 2024-11-06 ENCOUNTER — APPOINTMENT (OUTPATIENT)
Dept: UROLOGY | Facility: CLINIC | Age: 44
End: 2024-11-06
Payer: MEDICAID

## 2024-11-06 VITALS
SYSTOLIC BLOOD PRESSURE: 148 MMHG | OXYGEN SATURATION: 100 % | DIASTOLIC BLOOD PRESSURE: 80 MMHG | TEMPERATURE: 97.4 F | HEART RATE: 73 BPM | RESPIRATION RATE: 17 BRPM

## 2024-11-06 DIAGNOSIS — N28.89 OTHER SPECIFIED DISORDERS OF KIDNEY AND URETER: ICD-10-CM

## 2024-11-06 PROCEDURE — 99203 OFFICE O/P NEW LOW 30 MIN: CPT

## 2024-11-12 ENCOUNTER — APPOINTMENT (OUTPATIENT)
Dept: CT IMAGING | Facility: IMAGING CENTER | Age: 44
End: 2024-11-12
Payer: MEDICAID

## 2024-11-12 ENCOUNTER — OUTPATIENT (OUTPATIENT)
Dept: OUTPATIENT SERVICES | Facility: HOSPITAL | Age: 44
LOS: 1 days | End: 2024-11-12
Payer: MEDICAID

## 2024-11-12 DIAGNOSIS — Z00.8 ENCOUNTER FOR OTHER GENERAL EXAMINATION: ICD-10-CM

## 2024-11-12 DIAGNOSIS — N28.89 OTHER SPECIFIED DISORDERS OF KIDNEY AND URETER: ICD-10-CM

## 2024-11-12 PROCEDURE — 74178 CT ABD&PLV WO CNTR FLWD CNTR: CPT | Mod: 26

## 2024-11-12 PROCEDURE — 74178 CT ABD&PLV WO CNTR FLWD CNTR: CPT

## 2024-11-19 ENCOUNTER — APPOINTMENT (OUTPATIENT)
Dept: PULMONOLOGY | Facility: CLINIC | Age: 44
End: 2024-11-19

## 2024-12-27 ENCOUNTER — OUTPATIENT (OUTPATIENT)
Dept: OUTPATIENT SERVICES | Facility: HOSPITAL | Age: 44
LOS: 1 days | End: 2024-12-27
Payer: MEDICAID

## 2024-12-27 VITALS
RESPIRATION RATE: 18 BRPM | TEMPERATURE: 98 F | HEART RATE: 100 BPM | HEIGHT: 62 IN | WEIGHT: 222.01 LBS | OXYGEN SATURATION: 98 % | DIASTOLIC BLOOD PRESSURE: 86 MMHG | SYSTOLIC BLOOD PRESSURE: 142 MMHG

## 2024-12-27 DIAGNOSIS — Z90.3 ACQUIRED ABSENCE OF STOMACH [PART OF]: Chronic | ICD-10-CM

## 2024-12-27 DIAGNOSIS — Z98.891 HISTORY OF UTERINE SCAR FROM PREVIOUS SURGERY: Chronic | ICD-10-CM

## 2024-12-27 DIAGNOSIS — G47.33 OBSTRUCTIVE SLEEP APNEA (ADULT) (PEDIATRIC): ICD-10-CM

## 2024-12-27 DIAGNOSIS — K44.9 DIAPHRAGMATIC HERNIA WITHOUT OBSTRUCTION OR GANGRENE: ICD-10-CM

## 2024-12-27 DIAGNOSIS — Z01.818 ENCOUNTER FOR OTHER PREPROCEDURAL EXAMINATION: ICD-10-CM

## 2024-12-27 LAB
ALBUMIN SERPL ELPH-MCNC: 4.1 G/DL — SIGNIFICANT CHANGE UP (ref 3.3–5)
ALP SERPL-CCNC: 50 U/L — SIGNIFICANT CHANGE UP (ref 40–120)
ALT FLD-CCNC: 8 U/L — LOW (ref 10–45)
ANION GAP SERPL CALC-SCNC: 9 MMOL/L — SIGNIFICANT CHANGE UP (ref 5–17)
AST SERPL-CCNC: 11 U/L — SIGNIFICANT CHANGE UP (ref 10–40)
BILIRUB SERPL-MCNC: 0.4 MG/DL — SIGNIFICANT CHANGE UP (ref 0.2–1.2)
BLD GP AB SCN SERPL QL: NEGATIVE — SIGNIFICANT CHANGE UP
BUN SERPL-MCNC: 13 MG/DL — SIGNIFICANT CHANGE UP (ref 7–23)
CALCIUM SERPL-MCNC: 9.2 MG/DL — SIGNIFICANT CHANGE UP (ref 8.4–10.5)
CHLORIDE SERPL-SCNC: 107 MMOL/L — SIGNIFICANT CHANGE UP (ref 96–108)
CO2 SERPL-SCNC: 21 MMOL/L — LOW (ref 22–31)
CREAT SERPL-MCNC: 0.65 MG/DL — SIGNIFICANT CHANGE UP (ref 0.5–1.3)
EGFR: 112 ML/MIN/1.73M2 — SIGNIFICANT CHANGE UP
GLUCOSE SERPL-MCNC: 76 MG/DL — SIGNIFICANT CHANGE UP (ref 70–99)
HCT VFR BLD CALC: 39.3 % — SIGNIFICANT CHANGE UP (ref 34.5–45)
HGB BLD-MCNC: 11.9 G/DL — SIGNIFICANT CHANGE UP (ref 11.5–15.5)
MCHC RBC-ENTMCNC: 24.8 PG — LOW (ref 27–34)
MCHC RBC-ENTMCNC: 30.3 G/DL — LOW (ref 32–36)
MCV RBC AUTO: 81.9 FL — SIGNIFICANT CHANGE UP (ref 80–100)
NRBC # BLD: 0 /100 WBCS — SIGNIFICANT CHANGE UP (ref 0–0)
PLATELET # BLD AUTO: 325 K/UL — SIGNIFICANT CHANGE UP (ref 150–400)
POTASSIUM SERPL-MCNC: 3.7 MMOL/L — SIGNIFICANT CHANGE UP (ref 3.5–5.3)
POTASSIUM SERPL-SCNC: 3.7 MMOL/L — SIGNIFICANT CHANGE UP (ref 3.5–5.3)
PROT SERPL-MCNC: 7.4 G/DL — SIGNIFICANT CHANGE UP (ref 6–8.3)
RBC # BLD: 4.8 M/UL — SIGNIFICANT CHANGE UP (ref 3.8–5.2)
RBC # FLD: 14.4 % — SIGNIFICANT CHANGE UP (ref 10.3–14.5)
RH IG SCN BLD-IMP: POSITIVE — SIGNIFICANT CHANGE UP
SODIUM SERPL-SCNC: 137 MMOL/L — SIGNIFICANT CHANGE UP (ref 135–145)
WBC # BLD: 7.72 K/UL — SIGNIFICANT CHANGE UP (ref 3.8–10.5)
WBC # FLD AUTO: 7.72 K/UL — SIGNIFICANT CHANGE UP (ref 3.8–10.5)

## 2024-12-27 PROCEDURE — G0463: CPT

## 2024-12-27 PROCEDURE — 83036 HEMOGLOBIN GLYCOSYLATED A1C: CPT

## 2024-12-27 PROCEDURE — 86850 RBC ANTIBODY SCREEN: CPT

## 2024-12-27 PROCEDURE — 85027 COMPLETE CBC AUTOMATED: CPT

## 2024-12-27 PROCEDURE — 86901 BLOOD TYPING SEROLOGIC RH(D): CPT

## 2024-12-27 PROCEDURE — 80053 COMPREHEN METABOLIC PANEL: CPT

## 2024-12-27 PROCEDURE — 86900 BLOOD TYPING SEROLOGIC ABO: CPT

## 2024-12-27 RX ORDER — SODIUM CHLORIDE 9 MG/ML
3 INJECTION, SOLUTION INTRAMUSCULAR; INTRAVENOUS; SUBCUTANEOUS EVERY 8 HOURS
Refills: 0 | Status: DISCONTINUED | OUTPATIENT
Start: 2025-01-15 | End: 2025-01-15

## 2024-12-27 RX ORDER — VANCOMYCIN HYDROCHLORIDE 5 G/100ML
1500 INJECTION, POWDER, LYOPHILIZED, FOR SOLUTION INTRAVENOUS ONCE
Refills: 0 | Status: COMPLETED | OUTPATIENT
Start: 2025-01-15 | End: 2025-01-15

## 2024-12-27 RX ORDER — LIDOCAINE HYDROCHLORIDE 10 MG/ML
0.2 INJECTION INFILTRATION; PERINEURAL ONCE
Refills: 0 | Status: DISCONTINUED | OUTPATIENT
Start: 2025-01-15 | End: 2025-01-15

## 2024-12-27 NOTE — H&P PST ADULT - NSICDXPASTMEDICALHX_GEN_ALL_CORE_FT
PAST MEDICAL HISTORY:  Gastric ulcer treated with medication- resolved without surgical intervention    Hypertension     Morbid obesity     BEAU on CPAP

## 2024-12-27 NOTE — H&P PST ADULT - HISTORY OF PRESENT ILLNESS
44  year-old pMH of  HTN, BEAU with CPAP ( not using since after surgery ) , morbid obesity s/p  gastric sleeve - 2019 - lost 150 l bs  and then gained back after pregnancy in 2021. Presents to PST for scheduled  Robotic assisted  Hortensia En Y Gastric Bypass , Hiatal Hernia on 1/15/25.

## 2024-12-27 NOTE — H&P PST ADULT - PROBLEM SELECTOR PLAN 1
Robotic assisted  Hortensia En Y Gastric Bypass , Hiatal Hernia on 1/15/25.    pre-op instructions discussed   labs sent  [facility] ordered

## 2024-12-28 PROBLEM — R00.0 TACHYCARDIA, UNSPECIFIED: Chronic | Status: INACTIVE | Noted: 2019-05-13 | Resolved: 2024-12-27

## 2024-12-28 LAB
A1C WITH ESTIMATED AVERAGE GLUCOSE RESULT: 5.1 % — SIGNIFICANT CHANGE UP (ref 4–5.6)
ESTIMATED AVERAGE GLUCOSE: 100 MG/DL — SIGNIFICANT CHANGE UP (ref 68–114)

## 2024-12-31 NOTE — PHARMACOTHERAPY INTERVENTION NOTE - COMMENTS
Initial Pharmacy Review  HPI:  44  year-old for scheduled  Robotic assisted  Hortensia En Y Gastric Bypass , Hiatal Hernia on 1/15/25.  PMH of  HTN, BEAU with CPAP ( not using since after surgery ) , morbid obesity s/p  gastric sleeve.    Home Medications:  amLODIPine 5 mg oral tablet: 1 tab(s) orally once a day   ibuprofen 400 mg oral tablet: 1 tab(s) orally every 8 hours, As Needed  multivitamin: Apply patch topically once a day  Vitamin B12 with Ubiquinone oral tablet, chewable: 2 tab(s) chewed once a day  Vitamin D3 125 mcg (5000 intl units) oral capsule: 1 cap(s) orally once a day    Discharge Recommendations:  Crush amlodipine  Stop ibuprofen  Continue vitamins and supplements in chewable/dissolvable forms      Mila Curiel, PharmD, BCPS  Available on Microsoft Teams

## 2025-01-13 NOTE — PATIENT PROFILE ADULT - FALL HARM RISK TYPE OF ASSESSMENT
Diagnosis: Permanent Afib, DVT.    Goal is 2.0 - 3.0   Referral Provider: LUISANA Montes  Service to Anticoagulation order expires: 9/10/25   Patient came to clinic for anticoagulation monitoring.  Last INR on 11/18/24 was 2.6.  Dose maintained.   Today's INR is 2.4 and is within goal range.    Current warfarin total weekly dose of 25 mg verified.  Informed the INR result is within therapeutic range and instructed to maintain current dose per protocol. Discussed dose and return date of 2/24/25 for next INR. See Anticoagulation flowsheet.    Dr Colin Young is in the office today supervising the treatment.  AVS Declined - patient requests an appointment card only.     Instructed to contact the clinic with any unusual bleeding or bruising, any changes in medications, diet, health status, lifestyle, or any other changes, questions or concerns. Verbalized understanding of all discussed.      admission

## 2025-01-15 ENCOUNTER — INPATIENT (INPATIENT)
Facility: HOSPITAL | Age: 45
LOS: 0 days | Discharge: HOME | DRG: 392 | End: 2025-01-16
Attending: SURGERY | Admitting: SURGERY
Payer: MEDICAID

## 2025-01-15 ENCOUNTER — TRANSCRIPTION ENCOUNTER (OUTPATIENT)
Age: 45
End: 2025-01-15

## 2025-01-15 ENCOUNTER — RESULT REVIEW (OUTPATIENT)
Age: 45
End: 2025-01-15

## 2025-01-15 ENCOUNTER — APPOINTMENT (OUTPATIENT)
Dept: SURGERY | Facility: HOSPITAL | Age: 45
End: 2025-01-15

## 2025-01-15 VITALS
OXYGEN SATURATION: 100 % | TEMPERATURE: 98 F | SYSTOLIC BLOOD PRESSURE: 135 MMHG | DIASTOLIC BLOOD PRESSURE: 91 MMHG | HEART RATE: 97 BPM | RESPIRATION RATE: 17 BRPM | WEIGHT: 216.71 LBS | HEIGHT: 62 IN

## 2025-01-15 DIAGNOSIS — Z90.3 ACQUIRED ABSENCE OF STOMACH [PART OF]: Chronic | ICD-10-CM

## 2025-01-15 DIAGNOSIS — Z98.891 HISTORY OF UTERINE SCAR FROM PREVIOUS SURGERY: Chronic | ICD-10-CM

## 2025-01-15 DIAGNOSIS — K44.9 DIAPHRAGMATIC HERNIA WITHOUT OBSTRUCTION OR GANGRENE: ICD-10-CM

## 2025-01-15 LAB
ANION GAP SERPL CALC-SCNC: 17 MMOL/L — SIGNIFICANT CHANGE UP (ref 5–17)
BASOPHILS # BLD AUTO: 0.03 K/UL — SIGNIFICANT CHANGE UP (ref 0–0.2)
BASOPHILS NFR BLD AUTO: 0.2 % — SIGNIFICANT CHANGE UP (ref 0–2)
BUN SERPL-MCNC: 8 MG/DL — SIGNIFICANT CHANGE UP (ref 7–23)
CALCIUM SERPL-MCNC: 8.5 MG/DL — SIGNIFICANT CHANGE UP (ref 8.4–10.5)
CHLORIDE SERPL-SCNC: 101 MMOL/L — SIGNIFICANT CHANGE UP (ref 96–108)
CO2 SERPL-SCNC: 18 MMOL/L — LOW (ref 22–31)
CREAT SERPL-MCNC: 0.47 MG/DL — LOW (ref 0.5–1.3)
EGFR: 121 ML/MIN/1.73M2 — SIGNIFICANT CHANGE UP
EOSINOPHIL # BLD AUTO: 0 K/UL — SIGNIFICANT CHANGE UP (ref 0–0.5)
EOSINOPHIL NFR BLD AUTO: 0 % — SIGNIFICANT CHANGE UP (ref 0–6)
GLUCOSE BLDC GLUCOMTR-MCNC: 93 MG/DL — SIGNIFICANT CHANGE UP (ref 70–99)
GLUCOSE SERPL-MCNC: 141 MG/DL — HIGH (ref 70–99)
HCG UR QL: NEGATIVE — SIGNIFICANT CHANGE UP
HCT VFR BLD CALC: 36.8 % — SIGNIFICANT CHANGE UP (ref 34.5–45)
HGB BLD-MCNC: 11.2 G/DL — LOW (ref 11.5–15.5)
IMM GRANULOCYTES NFR BLD AUTO: 0.4 % — SIGNIFICANT CHANGE UP (ref 0–0.9)
LYMPHOCYTES # BLD AUTO: 1.18 K/UL — SIGNIFICANT CHANGE UP (ref 1–3.3)
LYMPHOCYTES # BLD AUTO: 8.8 % — LOW (ref 13–44)
MAGNESIUM SERPL-MCNC: 2.1 MG/DL — SIGNIFICANT CHANGE UP (ref 1.6–2.6)
MCHC RBC-ENTMCNC: 23.8 PG — LOW (ref 27–34)
MCHC RBC-ENTMCNC: 30.4 G/DL — LOW (ref 32–36)
MCV RBC AUTO: 78.1 FL — LOW (ref 80–100)
MONOCYTES # BLD AUTO: 0.4 K/UL — SIGNIFICANT CHANGE UP (ref 0–0.9)
MONOCYTES NFR BLD AUTO: 3 % — SIGNIFICANT CHANGE UP (ref 2–14)
NEUTROPHILS # BLD AUTO: 11.76 K/UL — HIGH (ref 1.8–7.4)
NEUTROPHILS NFR BLD AUTO: 87.6 % — HIGH (ref 43–77)
NRBC # BLD: 0 /100 WBCS — SIGNIFICANT CHANGE UP (ref 0–0)
PHOSPHATE SERPL-MCNC: 3.5 MG/DL — SIGNIFICANT CHANGE UP (ref 2.5–4.5)
PLATELET # BLD AUTO: 294 K/UL — SIGNIFICANT CHANGE UP (ref 150–400)
POTASSIUM SERPL-MCNC: 3.3 MMOL/L — LOW (ref 3.5–5.3)
POTASSIUM SERPL-SCNC: 3.3 MMOL/L — LOW (ref 3.5–5.3)
RBC # BLD: 4.71 M/UL — SIGNIFICANT CHANGE UP (ref 3.8–5.2)
RBC # FLD: 14.2 % — SIGNIFICANT CHANGE UP (ref 10.3–14.5)
SODIUM SERPL-SCNC: 136 MMOL/L — SIGNIFICANT CHANGE UP (ref 135–145)
WBC # BLD: 13.43 K/UL — HIGH (ref 3.8–10.5)
WBC # FLD AUTO: 13.43 K/UL — HIGH (ref 3.8–10.5)

## 2025-01-15 PROCEDURE — 88342 IMHCHEM/IMCYTCHM 1ST ANTB: CPT | Mod: 26

## 2025-01-15 PROCEDURE — 43281 LAP PARAESOPHAG HERN REPAIR: CPT

## 2025-01-15 PROCEDURE — 43659 UNLISTED LAPS PX STOMACH: CPT

## 2025-01-15 PROCEDURE — 88307 TISSUE EXAM BY PATHOLOGIST: CPT | Mod: 26

## 2025-01-15 PROCEDURE — S2900 ROBOTIC SURGICAL SYSTEM: CPT | Mod: NC

## 2025-01-15 PROCEDURE — 43644 LAP GASTRIC BYPASS/ROUX-EN-Y: CPT

## 2025-01-15 DEVICE — STAPLER COVIDIEN TRI-STAPLE 30MM PURPLE RELOAD: Type: IMPLANTABLE DEVICE | Status: FUNCTIONAL

## 2025-01-15 DEVICE — STAPLER COVIDIEN TRI-STAPLE 60MM PURPLE RELOAD: Type: IMPLANTABLE DEVICE | Status: FUNCTIONAL

## 2025-01-15 DEVICE — STAPLER COVIDIEN TRI-STAPLE 60MM TAN RELOAD: Type: IMPLANTABLE DEVICE | Status: FUNCTIONAL

## 2025-01-15 RX ORDER — THIAMINE HYDROCHLORIDE 100 MG/ML
100 INJECTION, SOLUTION INTRAMUSCULAR; INTRAVENOUS ONCE
Refills: 0 | Status: COMPLETED | OUTPATIENT
Start: 2025-01-15 | End: 2025-01-15

## 2025-01-15 RX ORDER — SODIUM CHLORIDE 9 MG/ML
1000 INJECTION, SOLUTION INTRAVENOUS
Refills: 0 | Status: DISCONTINUED | OUTPATIENT
Start: 2025-01-15 | End: 2025-01-16

## 2025-01-15 RX ORDER — VITAMIN A 10000 UNIT
1 TABLET ORAL ONCE
Refills: 0 | Status: COMPLETED | OUTPATIENT
Start: 2025-01-15 | End: 2025-01-15

## 2025-01-15 RX ORDER — HEPARIN SODIUM 1000 [USP'U]/ML
5000 INJECTION, SOLUTION INTRAVENOUS; SUBCUTANEOUS EVERY 8 HOURS
Refills: 0 | Status: DISCONTINUED | OUTPATIENT
Start: 2025-01-15 | End: 2025-01-16

## 2025-01-15 RX ORDER — CHLORHEXIDINE GLUCONATE 1.2 MG/ML
1 RINSE ORAL ONCE
Refills: 0 | Status: COMPLETED | OUTPATIENT
Start: 2025-01-15 | End: 2025-01-15

## 2025-01-15 RX ORDER — ONDANSETRON 4 MG/1
4 TABLET ORAL EVERY 6 HOURS
Refills: 0 | Status: DISCONTINUED | OUTPATIENT
Start: 2025-01-15 | End: 2025-01-16

## 2025-01-15 RX ORDER — CLINDAMYCIN HYDROCHLORIDE 300 MG/1
600 CAPSULE ORAL EVERY 8 HOURS
Refills: 0 | Status: COMPLETED | OUTPATIENT
Start: 2025-01-15 | End: 2025-01-16

## 2025-01-15 RX ORDER — KETOROLAC TROMETHAMINE 30 MG/ML
30 INJECTION INTRAMUSCULAR; INTRAVENOUS EVERY 6 HOURS
Refills: 0 | Status: DISCONTINUED | OUTPATIENT
Start: 2025-01-15 | End: 2025-01-16

## 2025-01-15 RX ORDER — FOSAPREPITANT DIMEGLUMINE 150 MG/5ML
150 INJECTION, POWDER, LYOPHILIZED, FOR SOLUTION INTRAVENOUS ONCE
Refills: 0 | Status: COMPLETED | OUTPATIENT
Start: 2025-01-15 | End: 2025-01-15

## 2025-01-15 RX ORDER — HEPARIN SODIUM 1000 [USP'U]/ML
5000 INJECTION, SOLUTION INTRAVENOUS; SUBCUTANEOUS EVERY 8 HOURS
Refills: 0 | Status: COMPLETED | OUTPATIENT
Start: 2025-01-15 | End: 2025-01-15

## 2025-01-15 RX ORDER — HEPARIN SODIUM 1000 [USP'U]/ML
7500 INJECTION, SOLUTION INTRAVENOUS; SUBCUTANEOUS ONCE
Refills: 0 | Status: DISCONTINUED | OUTPATIENT
Start: 2025-01-15 | End: 2025-01-15

## 2025-01-15 RX ORDER — PANTOPRAZOLE 40 MG/1
40 TABLET, DELAYED RELEASE ORAL EVERY 24 HOURS
Refills: 0 | Status: DISCONTINUED | OUTPATIENT
Start: 2025-01-15 | End: 2025-01-16

## 2025-01-15 RX ORDER — CHOLECALCIFEROL (VITAMIN D3) 10 MCG
1 TABLET ORAL
Refills: 0 | DISCHARGE

## 2025-01-15 RX ORDER — ONDANSETRON 4 MG/1
4 TABLET ORAL ONCE
Refills: 0 | Status: DISCONTINUED | OUTPATIENT
Start: 2025-01-15 | End: 2025-01-15

## 2025-01-15 RX ORDER — POTASSIUM CHLORIDE 600 MG/1
10 TABLET, FILM COATED, EXTENDED RELEASE ORAL
Refills: 0 | Status: COMPLETED | OUTPATIENT
Start: 2025-01-15 | End: 2025-01-15

## 2025-01-15 RX ORDER — HYDROMORPHONE HCL 4 MG
0.25 TABLET ORAL
Refills: 0 | Status: DISCONTINUED | OUTPATIENT
Start: 2025-01-15 | End: 2025-01-15

## 2025-01-15 RX ORDER — ACETAMINOPHEN 80 MG/.8ML
1000 SOLUTION/ DROPS ORAL ONCE
Refills: 0 | Status: COMPLETED | OUTPATIENT
Start: 2025-01-15 | End: 2025-01-15

## 2025-01-15 RX ADMIN — HEPARIN SODIUM 5000 UNIT(S): 1000 INJECTION, SOLUTION INTRAVENOUS; SUBCUTANEOUS at 19:13

## 2025-01-15 RX ADMIN — CLINDAMYCIN HYDROCHLORIDE 100 MILLIGRAM(S): 300 CAPSULE ORAL at 19:13

## 2025-01-15 RX ADMIN — PANTOPRAZOLE 40 MILLIGRAM(S): 40 TABLET, DELAYED RELEASE ORAL at 15:53

## 2025-01-15 RX ADMIN — KETOROLAC TROMETHAMINE 30 MILLIGRAM(S): 30 INJECTION INTRAMUSCULAR; INTRAVENOUS at 23:16

## 2025-01-15 RX ADMIN — ACETAMINOPHEN 1000 MILLIGRAM(S): 80 SOLUTION/ DROPS ORAL at 23:16

## 2025-01-15 RX ADMIN — CHLORHEXIDINE GLUCONATE 1 APPLICATION(S): 1.2 RINSE ORAL at 09:12

## 2025-01-15 RX ADMIN — Medication 0.25 MILLIGRAM(S): at 15:16

## 2025-01-15 RX ADMIN — SODIUM CHLORIDE 250 MILLILITER(S): 9 INJECTION, SOLUTION INTRAVENOUS at 15:53

## 2025-01-15 RX ADMIN — SODIUM CHLORIDE 3 MILLILITER(S): 9 INJECTION, SOLUTION INTRAMUSCULAR; INTRAVENOUS; SUBCUTANEOUS at 09:12

## 2025-01-15 RX ADMIN — THIAMINE HYDROCHLORIDE 100 MILLIGRAM(S): 100 INJECTION, SOLUTION INTRAMUSCULAR; INTRAVENOUS at 15:53

## 2025-01-15 RX ADMIN — KETOROLAC TROMETHAMINE 30 MILLIGRAM(S): 30 INJECTION INTRAMUSCULAR; INTRAVENOUS at 21:15

## 2025-01-15 RX ADMIN — HEPARIN SODIUM 5000 UNIT(S): 1000 INJECTION, SOLUTION INTRAVENOUS; SUBCUTANEOUS at 09:08

## 2025-01-15 RX ADMIN — Medication 1 MILLIGRAM(S): at 15:53

## 2025-01-15 RX ADMIN — ACETAMINOPHEN 400 MILLIGRAM(S): 80 SOLUTION/ DROPS ORAL at 19:13

## 2025-01-15 RX ADMIN — Medication 0.25 MILLIGRAM(S): at 15:01

## 2025-01-15 RX ADMIN — POTASSIUM CHLORIDE 100 MILLIEQUIVALENT(S): 600 TABLET, FILM COATED, EXTENDED RELEASE ORAL at 19:13

## 2025-01-15 RX ADMIN — FOSAPREPITANT DIMEGLUMINE 150 MILLIGRAM(S): 150 INJECTION, POWDER, LYOPHILIZED, FOR SOLUTION INTRAVENOUS at 09:09

## 2025-01-15 RX ADMIN — POTASSIUM CHLORIDE 100 MILLIEQUIVALENT(S): 600 TABLET, FILM COATED, EXTENDED RELEASE ORAL at 17:08

## 2025-01-15 RX ADMIN — POTASSIUM CHLORIDE 100 MILLIEQUIVALENT(S): 600 TABLET, FILM COATED, EXTENDED RELEASE ORAL at 15:53

## 2025-01-15 NOTE — BRIEF OPERATIVE NOTE - NSICDXBRIEFPROCEDURE_GEN_ALL_CORE_FT
PROCEDURES:  Conversion, gastrectomy, sleeve, to Hortensia-en-Y gastric bypass, robot-assisted, laparoscopic, using da Huong Xi 15-Shaggy-2025 14:32:14  Flash Andrade  Repair, hiatal hernia, robot-assisted 15-Shaggy-2025 14:32:37  Flash Andrade

## 2025-01-15 NOTE — BRIEF OPERATIVE NOTE - NSICDXBRIEFPOSTOP_GEN_ALL_CORE_FT
POST-OP DIAGNOSIS:  Hiatal hernia with GERD 15-Shaggy-2025 14:33:08  Flash Andrade  S/P gastric sleeve procedure 15-Shaggy-2025 14:34:06  Flash Andrade

## 2025-01-15 NOTE — CHART NOTE - NSCHARTNOTEFT_GEN_A_CORE
Post Operative Note  Patient: ESTEFANIA DO 44y (1980) Female   MRN: [id number]  [facility]    **************incomplete note    Procedure: S/P ***    Subjective: Patient seen and examined post operatively. Reports pain as controlled. Denies nausea, vomiting, fever, chills, chest pain, SOB, cough.      Objective:  Vitals: T(F): 97.9 (01-15-25 @ 18:00), Max: 98.2 (01-15-25 @ 08:52)  HR: 83 (01-15-25 @ 18:00)  BP: 140/74 (01-15-25 @ 18:00) (116/75 - 141/86)  RR: 15 (01-15-25 @ 18:00)  SpO2: 100% (01-15-25 @ 18:00)  Vent Settings:     In:   01-15-25 @ 07:01  -  01-15-25 @ 18:41  --------------------------------------------------------  IN: 1000 mL      IV Fluids: lactated ringers. 1000 milliLiter(s) (150 mL/Hr) IV Continuous <Continuous>  potassium chloride  10 mEq/100 mL IVPB 10 milliEquivalent(s) IV Intermittent every 1 hour  sodium chloride 0.9% 1000 milliLiter(s) (250 mL/Hr) IV Continuous <Continuous>      Out:   01-15-25 @ 07:01  -  01-15-25 @ 18:41  --------------------------------------------------------  OUT: 0 mL      EBL:     Voided Urine:   01-15-25 @ 07:01  -  01-15-25 @ 18:41  --------------------------------------------------------  OUT: 0 mL      Sal Catheter: yes no   Drains:   ALEX:    ,   Chest Tube:      NG Tube:         Physical Examination:  General: NAD, resting comfortably in bed  HEENT: Normocephalic atraumatic.Respiratory: Nonlabored respirations, normal CW expansion.  Cardio: Pulse regular presently.  Abdomen:  Soft, nondistended, appropriate incisional tenderness, dressings clean and dry and intact.  Vascular: extremities are warm and well perfused.     Imaging:  No post-op imaging studies    Assessment:  44yFemale patient S/P *** for ***    Plan:  - Pain control tylenol PRN, oxycodone PRN  - Diet: RD  - IVF  - Monitor ALEX drain output  - f/u _ Calcium level and replete with calcium gluconate as needed  - Activity: OOBAT  - no AM labs  - DVT ppx: SCD's & no chemical ppx  - dispo: d/c to home tomorrow    Date/Time: 01-15-25 @ 18:41 Post Operative Note  Patient: ESTEFANIA DO 44y (1980) Female   MRN: [id number]    Procedure: S/P robotic assisted sleeve gastrectomy convert to Hortensia-en-Y gastric bypass, hiatal hernia repair    Subjective: Patient seen and examined post operatively. Reports some pain at the moment. Ambulated to void. Denies nausea, vomiting, fever, chills, chest pain, SOB, cough.      Objective:  Vitals: T(F): 97.9 (01-15-25 @ 18:00), Max: 98.2 (01-15-25 @ 08:52)  HR: 83 (01-15-25 @ 18:00)  BP: 140/74 (01-15-25 @ 18:00) (116/75 - 141/86)  RR: 15 (01-15-25 @ 18:00)  SpO2: 100% (01-15-25 @ 18:00)  Vent Settings:     In:   01-15-25 @ 07:01  -  01-15-25 @ 18:41  --------------------------------------------------------  IN: 1000 mL      IV Fluids: lactated ringers. 1000 milliLiter(s) (150 mL/Hr) IV Continuous <Continuous>  potassium chloride  10 mEq/100 mL IVPB 10 milliEquivalent(s) IV Intermittent every 1 hour  sodium chloride 0.9% 1000 milliLiter(s) (250 mL/Hr) IV Continuous <Continuous>      Out:   01-15-25 @ 07:01  -  01-15-25 @ 18:41  --------------------------------------------------------  OUT: 0 mL      EBL:     Voided Urine:   01-15-25 @ 07:01  -  01-15-25 @ 18:41  --------------------------------------------------------  OUT: 0 mL      Sal Catheter: yes no   Drains:   ALEX:    ,   Chest Tube:      NG Tube:         Physical Examination:  General: NAD, resting comfortably in bed  HEENT: Normocephalic atraumatic.  Respiratory: Nonlabored respirations, normal CW expansion.  Cardio: Pulse regular presently.  Abdomen: Soft, nondistended, appropriate incisional tenderness, dressings clean and dry and intact x7.  Vascular: extremities are warm and well perfused.     Imaging:  No post-op imaging studies    Assessment:  44yFemale patient S/P robotic assisted sleeve gastrectomy convert to Hortensia-en-Y gastric bypass, hiatal hernia repair    Plan:  - Pain control 30mg toradol PRN; antiemetics PRN  - Diet: Bariatric CLD  - IVF: 0.9 NaCl w/ multivitamin additive x4 hours -> LR  - PPI  - home meds restarted  - f/u AM labs   - DVT ppx: SQH, SCD's       [facility]   [id number] Post Operative Note  Patient: ESTEFANIA DO 44y (1980) Female   MRN: [id number]    Procedure: S/P robotic assisted sleeve gastrectomy convert to Hortensia-en-Y gastric bypass, hiatal hernia repair    Subjective: Patient seen and examined post operatively. Reports some pain at the moment. Ambulated to void. Denies nausea, vomiting, fever, chills, chest pain, SOB, cough.      Objective:  Vitals: T(F): 97.9 (01-15-25 @ 18:00), Max: 98.2 (01-15-25 @ 08:52)  HR: 83 (01-15-25 @ 18:00)  BP: 140/74 (01-15-25 @ 18:00) (116/75 - 141/86)  RR: 15 (01-15-25 @ 18:00)  SpO2: 100% (01-15-25 @ 18:00)  Vent Settings:     In:   01-15-25 @ 07:01  -  01-15-25 @ 18:41  --------------------------------------------------------  IN: 1000 mL      IV Fluids: lactated ringers. 1000 milliLiter(s) (150 mL/Hr) IV Continuous <Continuous>  potassium chloride  10 mEq/100 mL IVPB 10 milliEquivalent(s) IV Intermittent every 1 hour  sodium chloride 0.9% 1000 milliLiter(s) (250 mL/Hr) IV Continuous <Continuous>      Out:   01-15-25 @ 07:01  -  01-15-25 @ 18:41  --------------------------------------------------------  OUT: 0 mL      EBL:     Voided Urine:   01-15-25 @ 07:01  -  01-15-25 @ 18:41  --------------------------------------------------------  OUT: 0 mL      Sal Catheter: yes no   Drains:   ALEX:    ,   Chest Tube:      NG Tube:         Physical Examination:  General: NAD, resting comfortably in bed  HEENT: Normocephalic atraumatic.  Respiratory: Nonlabored respirations, normal CW expansion.  Cardio: Pulse regular presently.  Abdomen: Soft, nondistended, appropriate incisional tenderness, dressings clean and dry and intact x7.  Vascular: extremities are warm and well perfused.     Imaging:  No post-op imaging studies    Assessment:  44yFemale patient S/P robotic assisted sleeve gastrectomy convert to Hortensia-en-Y gastric bypass, hiatal hernia repair    Plan:  - Pain control IV tylenol x24hrs, 30mg toradol x24s - staggered; antiemetics PRN  - Diet: Bariatric CLD  - IVF: 0.9 NaCl w/ multivitamin additive x4 hours -> LR  - PPI  - home meds restarted  - f/u AM labs   - DVT ppx: SQH, SCD's       [facility]   [id number]

## 2025-01-15 NOTE — BRIEF OPERATIVE NOTE - OPERATION/FINDINGS
Hiatal hernia repaired using non absorbable running v-lock. The gastric pouch was created using 2 loads of EndoGIA 60mm purple stapler. Hortensia limb was measured (75cm) and BP limb (50cm). Farah's and JJ mesenteric spaces were reapproximated. Leak test using ICG was negative

## 2025-01-15 NOTE — BRIEF OPERATIVE NOTE - NSICDXBRIEFPREOP_GEN_ALL_CORE_FT
PRE-OP DIAGNOSIS:  Hiatal hernia with GERD 15-Shaggy-2025 14:32:47  Flash Andrade  S/P gastric sleeve procedure 15-Shaggy-2025 14:32:54  Flash Andrade

## 2025-01-16 ENCOUNTER — TRANSCRIPTION ENCOUNTER (OUTPATIENT)
Age: 45
End: 2025-01-16

## 2025-01-16 VITALS
TEMPERATURE: 100 F | RESPIRATION RATE: 18 BRPM | SYSTOLIC BLOOD PRESSURE: 120 MMHG | OXYGEN SATURATION: 98 % | DIASTOLIC BLOOD PRESSURE: 76 MMHG | HEART RATE: 71 BPM

## 2025-01-16 LAB
ANION GAP SERPL CALC-SCNC: 13 MMOL/L — SIGNIFICANT CHANGE UP (ref 5–17)
ANION GAP SERPL CALC-SCNC: 14 MMOL/L — SIGNIFICANT CHANGE UP (ref 5–17)
BASOPHILS # BLD AUTO: 0 K/UL — SIGNIFICANT CHANGE UP (ref 0–0.2)
BASOPHILS NFR BLD AUTO: 0 % — SIGNIFICANT CHANGE UP (ref 0–2)
BUN SERPL-MCNC: 12 MG/DL — SIGNIFICANT CHANGE UP (ref 7–23)
BUN SERPL-MCNC: 8 MG/DL — SIGNIFICANT CHANGE UP (ref 7–23)
CALCIUM SERPL-MCNC: 8.9 MG/DL — SIGNIFICANT CHANGE UP (ref 8.4–10.5)
CALCIUM SERPL-MCNC: 9 MG/DL — SIGNIFICANT CHANGE UP (ref 8.4–10.5)
CHLORIDE SERPL-SCNC: 103 MMOL/L — SIGNIFICANT CHANGE UP (ref 96–108)
CHLORIDE SERPL-SCNC: 104 MMOL/L — SIGNIFICANT CHANGE UP (ref 96–108)
CO2 SERPL-SCNC: 18 MMOL/L — LOW (ref 22–31)
CO2 SERPL-SCNC: 19 MMOL/L — LOW (ref 22–31)
CREAT SERPL-MCNC: 0.48 MG/DL — LOW (ref 0.5–1.3)
CREAT SERPL-MCNC: 0.53 MG/DL — SIGNIFICANT CHANGE UP (ref 0.5–1.3)
EGFR: 118 ML/MIN/1.73M2 — SIGNIFICANT CHANGE UP
EGFR: 120 ML/MIN/1.73M2 — SIGNIFICANT CHANGE UP
EOSINOPHIL # BLD AUTO: 0 K/UL — SIGNIFICANT CHANGE UP (ref 0–0.5)
EOSINOPHIL NFR BLD AUTO: 0 % — SIGNIFICANT CHANGE UP (ref 0–6)
GLUCOSE SERPL-MCNC: 106 MG/DL — HIGH (ref 70–99)
GLUCOSE SERPL-MCNC: 124 MG/DL — HIGH (ref 70–99)
HCT VFR BLD CALC: 35.8 % — SIGNIFICANT CHANGE UP (ref 34.5–45)
HGB BLD-MCNC: 11 G/DL — LOW (ref 11.5–15.5)
IMM GRANULOCYTES NFR BLD AUTO: 0.3 % — SIGNIFICANT CHANGE UP (ref 0–0.9)
LYMPHOCYTES # BLD AUTO: 0.6 K/UL — LOW (ref 1–3.3)
LYMPHOCYTES # BLD AUTO: 6.7 % — LOW (ref 13–44)
MAGNESIUM SERPL-MCNC: 2 MG/DL — SIGNIFICANT CHANGE UP (ref 1.6–2.6)
MAGNESIUM SERPL-MCNC: 2 MG/DL — SIGNIFICANT CHANGE UP (ref 1.6–2.6)
MCHC RBC-ENTMCNC: 24.1 PG — LOW (ref 27–34)
MCHC RBC-ENTMCNC: 30.7 G/DL — LOW (ref 32–36)
MCV RBC AUTO: 78.3 FL — LOW (ref 80–100)
MONOCYTES # BLD AUTO: 0.41 K/UL — SIGNIFICANT CHANGE UP (ref 0–0.9)
MONOCYTES NFR BLD AUTO: 4.6 % — SIGNIFICANT CHANGE UP (ref 2–14)
NEUTROPHILS # BLD AUTO: 7.87 K/UL — HIGH (ref 1.8–7.4)
NEUTROPHILS NFR BLD AUTO: 88.4 % — HIGH (ref 43–77)
NRBC # BLD: 0 /100 WBCS — SIGNIFICANT CHANGE UP (ref 0–0)
PHOSPHATE SERPL-MCNC: 2.4 MG/DL — LOW (ref 2.5–4.5)
PHOSPHATE SERPL-MCNC: 3.2 MG/DL — SIGNIFICANT CHANGE UP (ref 2.5–4.5)
PLATELET # BLD AUTO: 285 K/UL — SIGNIFICANT CHANGE UP (ref 150–400)
POTASSIUM SERPL-MCNC: 3.9 MMOL/L — SIGNIFICANT CHANGE UP (ref 3.5–5.3)
POTASSIUM SERPL-MCNC: 4.3 MMOL/L — SIGNIFICANT CHANGE UP (ref 3.5–5.3)
POTASSIUM SERPL-SCNC: 3.9 MMOL/L — SIGNIFICANT CHANGE UP (ref 3.5–5.3)
POTASSIUM SERPL-SCNC: 4.3 MMOL/L — SIGNIFICANT CHANGE UP (ref 3.5–5.3)
RBC # BLD: 4.57 M/UL — SIGNIFICANT CHANGE UP (ref 3.8–5.2)
RBC # FLD: 14.4 % — SIGNIFICANT CHANGE UP (ref 10.3–14.5)
SODIUM SERPL-SCNC: 134 MMOL/L — LOW (ref 135–145)
SODIUM SERPL-SCNC: 137 MMOL/L — SIGNIFICANT CHANGE UP (ref 135–145)
WBC # BLD: 8.91 K/UL — SIGNIFICANT CHANGE UP (ref 3.8–10.5)
WBC # FLD AUTO: 8.91 K/UL — SIGNIFICANT CHANGE UP (ref 3.8–10.5)

## 2025-01-16 PROCEDURE — 36415 COLL VENOUS BLD VENIPUNCTURE: CPT

## 2025-01-16 PROCEDURE — 80048 BASIC METABOLIC PNL TOTAL CA: CPT

## 2025-01-16 PROCEDURE — 81025 URINE PREGNANCY TEST: CPT

## 2025-01-16 PROCEDURE — 82962 GLUCOSE BLOOD TEST: CPT

## 2025-01-16 PROCEDURE — 88307 TISSUE EXAM BY PATHOLOGIST: CPT

## 2025-01-16 PROCEDURE — 85025 COMPLETE CBC W/AUTO DIFF WBC: CPT

## 2025-01-16 PROCEDURE — 84100 ASSAY OF PHOSPHORUS: CPT

## 2025-01-16 PROCEDURE — C1889: CPT

## 2025-01-16 PROCEDURE — 83735 ASSAY OF MAGNESIUM: CPT

## 2025-01-16 PROCEDURE — 88342 IMHCHEM/IMCYTCHM 1ST ANTB: CPT

## 2025-01-16 PROCEDURE — S2900: CPT

## 2025-01-16 RX ORDER — SODIUM PHOSPHATE, MONOBASIC, MONOHYDRATE AND SODIUM PHOSPHATE, DIBASIC ANHYDROUS 142; 276 MG/ML; MG/ML
15 INJECTION, SOLUTION INTRAVENOUS ONCE
Refills: 0 | Status: DISCONTINUED | OUTPATIENT
Start: 2025-01-16 | End: 2025-01-16

## 2025-01-16 RX ORDER — SODIUM PHOSPHATE, MONOBASIC, MONOHYDRATE AND SODIUM PHOSPHATE, DIBASIC ANHYDROUS 142; 276 MG/ML; MG/ML
15 INJECTION, SOLUTION INTRAVENOUS ONCE
Refills: 0 | Status: COMPLETED | OUTPATIENT
Start: 2025-01-16 | End: 2025-01-16

## 2025-01-16 RX ORDER — ACETAMINOPHEN 80 MG/.8ML
15 SOLUTION/ DROPS ORAL
Qty: 300 | Refills: 0
Start: 2025-01-16 | End: 2025-01-20

## 2025-01-16 RX ORDER — HYOSCYAMINE SULFATE 0.375 MG
1 TABLET, EXTENDED RELEASE 12 HR ORAL
Qty: 28 | Refills: 0
Start: 2025-01-16 | End: 2025-01-22

## 2025-01-16 RX ORDER — ACETAMINOPHEN 80 MG/.8ML
1000 SOLUTION/ DROPS ORAL ONCE
Refills: 0 | Status: COMPLETED | OUTPATIENT
Start: 2025-01-16 | End: 2025-01-16

## 2025-01-16 RX ORDER — ACETAMINOPHEN 80 MG/.8ML
1000 SOLUTION/ DROPS ORAL EVERY 6 HOURS
Refills: 0 | Status: DISCONTINUED | OUTPATIENT
Start: 2025-01-16 | End: 2025-01-16

## 2025-01-16 RX ORDER — ONDANSETRON 4 MG/1
1 TABLET ORAL
Qty: 28 | Refills: 0
Start: 2025-01-16 | End: 2025-01-22

## 2025-01-16 RX ORDER — OMEPRAZOLE MAGNESIUM 20 MG/1
1 CAPSULE, DELAYED RELEASE ORAL
Qty: 30 | Refills: 0
Start: 2025-01-16 | End: 2025-02-14

## 2025-01-16 RX ADMIN — KETOROLAC TROMETHAMINE 30 MILLIGRAM(S): 30 INJECTION INTRAMUSCULAR; INTRAVENOUS at 11:47

## 2025-01-16 RX ADMIN — CLINDAMYCIN HYDROCHLORIDE 100 MILLIGRAM(S): 300 CAPSULE ORAL at 04:29

## 2025-01-16 RX ADMIN — PANTOPRAZOLE 40 MILLIGRAM(S): 40 TABLET, DELAYED RELEASE ORAL at 16:18

## 2025-01-16 RX ADMIN — KETOROLAC TROMETHAMINE 30 MILLIGRAM(S): 30 INJECTION INTRAMUSCULAR; INTRAVENOUS at 16:19

## 2025-01-16 RX ADMIN — ACETAMINOPHEN 1000 MILLIGRAM(S): 80 SOLUTION/ DROPS ORAL at 13:28

## 2025-01-16 RX ADMIN — SODIUM PHOSPHATE, MONOBASIC, MONOHYDRATE AND SODIUM PHOSPHATE, DIBASIC ANHYDROUS 63.75 MILLIMOLE(S): 142; 276 INJECTION, SOLUTION INTRAVENOUS at 08:04

## 2025-01-16 RX ADMIN — KETOROLAC TROMETHAMINE 30 MILLIGRAM(S): 30 INJECTION INTRAMUSCULAR; INTRAVENOUS at 11:32

## 2025-01-16 RX ADMIN — ACETAMINOPHEN 400 MILLIGRAM(S): 80 SOLUTION/ DROPS ORAL at 13:13

## 2025-01-16 RX ADMIN — ACETAMINOPHEN 400 MILLIGRAM(S): 80 SOLUTION/ DROPS ORAL at 08:04

## 2025-01-16 RX ADMIN — KETOROLAC TROMETHAMINE 30 MILLIGRAM(S): 30 INJECTION INTRAMUSCULAR; INTRAVENOUS at 16:34

## 2025-01-16 RX ADMIN — HEPARIN SODIUM 5000 UNIT(S): 1000 INJECTION, SOLUTION INTRAVENOUS; SUBCUTANEOUS at 04:29

## 2025-01-16 RX ADMIN — KETOROLAC TROMETHAMINE 30 MILLIGRAM(S): 30 INJECTION INTRAMUSCULAR; INTRAVENOUS at 04:30

## 2025-01-16 RX ADMIN — ACETAMINOPHEN 1000 MILLIGRAM(S): 80 SOLUTION/ DROPS ORAL at 08:34

## 2025-01-16 RX ADMIN — ACETAMINOPHEN 400 MILLIGRAM(S): 80 SOLUTION/ DROPS ORAL at 00:34

## 2025-01-16 RX ADMIN — ACETAMINOPHEN 1000 MILLIGRAM(S): 80 SOLUTION/ DROPS ORAL at 04:46

## 2025-01-16 RX ADMIN — HEPARIN SODIUM 5000 UNIT(S): 1000 INJECTION, SOLUTION INTRAVENOUS; SUBCUTANEOUS at 11:33

## 2025-01-16 RX ADMIN — Medication 5 MILLIGRAM(S): at 05:01

## 2025-01-16 NOTE — DISCHARGE NOTE NURSING/CASE MANAGEMENT/SOCIAL WORK - NSDCPEFALRISK_GEN_ALL_CORE
For information on Fall & Injury Prevention, visit: [email or web address] OR  [email or web address] OR  https://www.cdc.gov/steadi/patient.html

## 2025-01-16 NOTE — DIETITIAN INITIAL EVALUATION ADULT - ORAL INTAKE PTA/DIET HISTORY
Pt reports following a full liquid diet for 2 weeks PTA as instructed by outpatient RD. Pt reports having Premier Protein, soups, dislike yogurt. NKFA. Pt denies chewing/swallowing difficulty, nausea, vomiting, diarrhea, constipation.

## 2025-01-16 NOTE — DIETITIAN INITIAL EVALUATION ADULT - ENERGY INTAKE
Pt now S/P gastric bypass. Pt denies N+V, sipping on bariatric clear liquids during RD visit. Pt with knowledge of bariatric full liquid diet and receptive to in depth review/reinforcement. Pt plans to schedule a follow up appointment with outpatient RD.  Adequate (%)

## 2025-01-16 NOTE — DIETITIAN INITIAL EVALUATION ADULT - PERTINENT LABORATORY DATA
01-16    134[L]  |  103  |  8   ----------------------------<  124[H]  4.3   |  18[L]  |  0.48[L]    Ca    9.0      16 Jan 2025 06:31  Phos  2.4     01-16  Mg     2.0     01-16    A1C with Estimated Average Glucose Result: 5.1 % (12-27-24 @ 17:45)

## 2025-01-16 NOTE — DISCHARGE NOTE NURSING/CASE MANAGEMENT/SOCIAL WORK - PATIENT PORTAL LINK FT
You can access the FollowMyHealth Patient Portal offered by [facility] by registering at the following website: [email or web address]. By joining [employer]’s FollowMyHealth portal, you will also be able to view your health information using other applications (apps) compatible with our system.

## 2025-01-16 NOTE — DISCHARGE NOTE PROVIDER - NSDCMRMEDTOKEN_GEN_ALL_CORE_FT
acetaminophen 500 mg/15 mL oral liquid: 15 milliliter(s) orally every 6 hours as needed for -pain  amLODIPine 5 mg oral tablet: 1 tab(s) orally once a day - crush  hyoscyamine 0.125 mg sublingual tablet: 1 tab(s) sublingual every 6 hours as needed for gastric spasm MDD: 4  omeprazole 40 mg oral delayed release capsule: 1 cap(s) orally once a day open and mix in 1 teaspoonful applesauce MDD: 1  ondansetron 4 mg oral tablet, disintegratin tab(s) orally every 6 hours as needed for -nausea  Vitamin D3 125 mcg (5000 intl units) oral capsule: 1 cap(s) orally once a day --chewable/dissolvable

## 2025-01-16 NOTE — PROGRESS NOTE ADULT - SUBJECTIVE AND OBJECTIVE BOX
Green Team Surgery Daily Progress Note     Interval Events: NAEO    SUBJECTIVE: Pt seen at bedside this morning.    Physical Exam:  General: NAD, resting comfortably in bed  HEENT: Normocephalic atraumatic.  Respiratory: Nonlabored respirations, normal CW expansion.  Cardio: Pulse regular presently.  Abdomen: Soft, nondistended, appropriate incisional tenderness, dressings clean and dry and intact x7.  Vascular: extremities are warm and well perfused.     Vital Signs Last 24 Hrs  T(C): 37.2 (15 Shaggy 2025 21:43), Max: 37.2 (15 Shaggy 2025 21:43)  T(F): 98.9 (15 Shaggy 2025 21:43), Max: 98.9 (15 Shaggy 2025 21:43)  HR: 85 (15 Shaggy 2025 21:43) (75 - 101)  BP: 137/77 (15 Shaggy 2025 21:43) (116/75 - 150/83)  BP(mean): 98 (15 Shaggy 2025 18:00) (89 - 108)  RR: 18 (15 Shaggy 2025 21:43) (15 - 18)  SpO2: 99% (15 Shaggy 2025 21:43) (99% - 100%)    Parameters below as of 15 Shaggy 2025 21:43  Patient On (Oxygen Delivery Method): room air        I&O's Summary    15 Shaggy 2025 07:01  -  16 Jan 2025 00:24  --------------------------------------------------------  IN: 1530 mL / OUT: 400 mL / NET: 1130 mL          LABS:                        11.2   13.43 )-----------( 294      ( 15 Shaggy 2025 15:00 )             36.8     01-15    136  |  101  |  8   ----------------------------<  141[H]  3.3[L]   |  18[L]  |  0.47[L]    Ca    8.5      15 Shaggy 2025 15:00  Phos  3.5     01-15  Mg     2.1     01-15        Urinalysis Basic - ( 15 Shaggy 2025 15:00 )    Color: x / Appearance: x / SG: x / pH: x  Gluc: 141 mg/dL / Ketone: x  / Bili: x / Urobili: x   Blood: x / Protein: x / Nitrite: x   Leuk Esterase: x / RBC: x / WBC x   Sq Epi: x / Non Sq Epi: x / Bacteria: x        RADIOLOGY & ADDITIONAL STUDIES: Green Team Surgery Daily Progress Note     Interval Events: NAEO    SUBJECTIVE: Pt seen at bedside this morning.      Vital Signs Last 24 Hrs  T(C): 37.2 (15 Shaggy 2025 21:43), Max: 37.2 (15 Shaggy 2025 21:43)  T(F): 98.9 (15 Shaggy 2025 21:43), Max: 98.9 (15 Shaggy 2025 21:43)  HR: 85 (15 Shaggy 2025 21:43) (75 - 101)  BP: 137/77 (15 Shaggy 2025 21:43) (116/75 - 150/83)  BP(mean): 98 (15 Shaggy 2025 18:00) (89 - 108)  RR: 18 (15 Shaggy 2025 21:43) (15 - 18)  SpO2: 99% (15 Shaggy 2025 21:43) (99% - 100%)    Parameters below as of 15 Shaggy 2025 21:43  Patient On (Oxygen Delivery Method): room air    Physical Exam:  General: NAD, resting comfortably in bed  HEENT: Normocephalic atraumatic  Respiratory: Nonlabored respirations  Abdomen: Soft, nondistended, appropriate incisional tenderness, dressings clean and dry and intact  Neuro: awake, alert and answering questions appropriately     I&O's Summary    15 Shaggy 2025 07:01  -  16 Jan 2025 00:24  --------------------------------------------------------  IN: 1530 mL / OUT: 400 mL / NET: 1130 mL          LABS:                        11.2   13.43 )-----------( 294      ( 15 Shaggy 2025 15:00 )             36.8     01-15    136  |  101  |  8   ----------------------------<  141[H]  3.3[L]   |  18[L]  |  0.47[L]    Ca    8.5      15 Shaggy 2025 15:00  Phos  3.5     01-15  Mg     2.1     01-15        Urinalysis Basic - ( 15 Shaggy 2025 15:00 )    Color: x / Appearance: x / SG: x / pH: x  Gluc: 141 mg/dL / Ketone: x  / Bili: x / Urobili: x   Blood: x / Protein: x / Nitrite: x   Leuk Esterase: x / RBC: x / WBC x   Sq Epi: x / Non Sq Epi: x / Bacteria: x        RADIOLOGY & ADDITIONAL STUDIES:

## 2025-01-16 NOTE — PROGRESS NOTE ADULT - SUBJECTIVE AND OBJECTIVE BOX
Post Op Day#: 1    Subjective: "Doing good, no pain, no N/V"    Objective: No acute events overnight. Effective pain control, no reports of N/V. Continuous pulse oximetry at bedside functioning,  v/s stable, afebrile. Labs within acceptable range. Ambulated independently around the unit.  Increasing PO intake of bariatric clear liquid diet .  Voiding as expected.                                               Vital Signs Last 24 Hrs  T(C): 37.4 (16 Jan 2025 04:20), Max: 37.4 (16 Jan 2025 04:20)  T(F): 99.3 (16 Jan 2025 04:20), Max: 99.3 (16 Jan 2025 04:20)  HR: 83 (16 Jan 2025 04:20) (75 - 101)  BP: 158/83 (16 Jan 2025 04:20) (116/75 - 166/98)  BP(mean): 98 (15 Shaggy 2025 18:00) (89 - 108)  RR: 18 (16 Jan 2025 04:20) (15 - 18)  SpO2: 98% (16 Jan 2025 04:20) (98% - 100%)    Parameters below as of 16 Jan 2025 04:20  Patient On (Oxygen Delivery Method): room air                                                   I&O's Summary    15 Shaggy 2025 07:01  -  16 Jan 2025 07:00  --------------------------------------------------------  IN: 3940 mL / OUT: 1300 mL / NET: 2640 mL                                                                          11.0   8.91  )-----------( 285      ( 16 Jan 2025 06:31 )             35.8                                                 01-16    134[L]  |  103  |  8   ----------------------------<  124[H]  4.3   |  18[L]  |  0.48[L]    Ca    9.0      16 Jan 2025 06:31  Phos  2.4     01-16  Mg     2.0     01-16      acetaminophen   IVPB .. 1000 milliGRAM(s) IV Intermittent every 6 hours  amLODIPine   Tablet 5 milliGRAM(s) Oral daily  heparin   Injectable 5000 Unit(s) SubCutaneous every 8 hours  ketorolac   Injectable 30 milliGRAM(s) IV Push every 6 hours  lactated ringers. 1000 milliLiter(s) IV Continuous <Continuous>  ondansetron Injectable 4 milliGRAM(s) IV Push every 6 hours PRN  pantoprazole  Injectable 40 milliGRAM(s) IV Push every 24 hours  sodium phosphate 15 milliMole(s)/250 mL IVPB 15 milliMole(s) IV Intermittent once      Physical Exam:         Lungs:  clear breath sounds b/l       Heart:  Regular rate & rhythm       Abdomen:  Soft, non-distended.  Scopes sites clean, dry and intact. + bs, - flatus, no rebound or guarding       Skin:  intact, pannus w/o rash       Extremities: + pulses, no edema, no calf tenderness, negative carlos's     VTE Extended Risk Assessment Scores             [facility]  VTE Predicted RISK: low ( <1%),       Assessment and Plan: 44 year-old pMH of  HTN, BEAU with CPAP, BMI 39.6. On 1/15/2025 pt underwent Robotic assisted conversion of sleeve to RY Gastric Bypass, Hiatal Hernia Repair, POD # 1    - Bariatric Clear diet today then protocol derived staged meal progression supervised by RD in outpatient setting  - Continue DVT/ GI prophylaxis, Incentive spirometry  - Ambulate as tolerated  - Procedure specific education including postop complications, medictions and side effects, diet, vitamins and VTE prevention. Written materials given  - Medication reviewed and reconciled, Bariatric meds obtained from [facility] prior to d/c  - D/C home after lunch once Bariatric 8-Point d/c criteria met  - Follow up with Dr Granda  in 7-10 days, Dietitian and PMD in 30 days.      Alejandra Rivas, ANDRÉS, ANP  [phone] Post Op Day#: 1    Subjective: "Doing good, no pain, no N/V"    Objective: No acute events overnight. Effective pain control, no reports of N/V. Continuous pulse oximetry at bedside functioning,  v/s stable, afebrile. Electrolyte imbalance (hyponatremic and hypophosphatemic) . Ambulated independently around the unit.  Increasing PO intake of bariatric clear liquid diet .  Voiding as expected.                                               Vital Signs Last 24 Hrs  T(C): 37.4 (16 Jan 2025 04:20), Max: 37.4 (16 Jan 2025 04:20)  T(F): 99.3 (16 Jan 2025 04:20), Max: 99.3 (16 Jan 2025 04:20)  HR: 83 (16 Jan 2025 04:20) (75 - 101)  BP: 158/83 (16 Jan 2025 04:20) (116/75 - 166/98)  BP(mean): 98 (15 Shaggy 2025 18:00) (89 - 108)  RR: 18 (16 Jan 2025 04:20) (15 - 18)  SpO2: 98% (16 Jan 2025 04:20) (98% - 100%)    Parameters below as of 16 Jan 2025 04:20  Patient On (Oxygen Delivery Method): room air                                                   I&O's Summary    15 Shaggy 2025 07:01  -  16 Jan 2025 07:00  --------------------------------------------------------  IN: 3940 mL / OUT: 1300 mL / NET: 2640 mL                                                                          11.0   8.91  )-----------( 285      ( 16 Jan 2025 06:31 )             35.8                                                 01-16    134[L]  |  103  |  8   ----------------------------<  124[H]  4.3   |  18[L]  |  0.48[L]    Ca    9.0      16 Jan 2025 06:31  Phos  2.4     01-16  Mg     2.0     01-16      acetaminophen   IVPB .. 1000 milliGRAM(s) IV Intermittent every 6 hours  amLODIPine   Tablet 5 milliGRAM(s) Oral daily  heparin   Injectable 5000 Unit(s) SubCutaneous every 8 hours  ketorolac   Injectable 30 milliGRAM(s) IV Push every 6 hours  lactated ringers. 1000 milliLiter(s) IV Continuous <Continuous>  ondansetron Injectable 4 milliGRAM(s) IV Push every 6 hours PRN  pantoprazole  Injectable 40 milliGRAM(s) IV Push every 24 hours  sodium phosphate 15 milliMole(s)/250 mL IVPB 15 milliMole(s) IV Intermittent once      Physical Exam:         Lungs:  clear breath sounds b/l       Heart:  Regular rate & rhythm       Abdomen:  Soft, non-distended.  Scopes sites clean, dry and intact. + bs, - flatus, no rebound or guarding       Skin:  intact, pannus w/o rash       Extremities: + pulses, no edema, no calf tenderness, negative carlos's     VTE Extended Risk Assessment Scores             [facility]  VTE Predicted RISK: low ( <1%),       Assessment and Plan: 44 year-old pMH of  HTN, BEAU with CPAP, BMI 39.6. On 1/15/2025 pt underwent Robotic assisted conversion of sleeve to RY Gastric Bypass, Hiatal Hernia Repair, POD # 1    - Bariatric Clear diet today then protocol derived staged meal progression supervised by RD in outpatient setting  - Continue DVT/ GI prophylaxis, Incentive spirometry  - Electrolyte replacement - Sodium Phosphate IVP   - Ambulate as tolerated  - Procedure specific education including postop complications, medications and side effects, diet, vitamins and VTE prevention. Written materials given  - Medication reviewed and reconciled, Bariatric meds obtained from [facility] prior to d/c  - D/C home after lunch once Bariatric 8-Point d/c criteria met  - Follow up with Dr Granda  in 7-10 days, Dietitian and PMD in 30 days.      Alejandra Rivas, ANDRÉS, ANP  [phone]

## 2025-01-16 NOTE — DISCHARGE NOTE PROVIDER - NSDCCPCAREPLAN_GEN_ALL_CORE_FT
PRINCIPAL DISCHARGE DIAGNOSIS  Diagnosis: Morbid obesity  Assessment and Plan of Treatment: bariatric liquid diet, dietary supplement, shower,  increase physical activity,

## 2025-01-16 NOTE — DISCHARGE NOTE PROVIDER - NSDCFUSCHEDAPPT_GEN_ALL_CORE_FT
Cayden Granda  [facility]  GENSURG [address]  Scheduled Appointment: 01/30/2025    Matt Mancuso  [facility]  PULMMED [address] [facility]  Scheduled Appointment: 01/30/2025    Cayden Granda  [facility]  GENSURG [address]  Scheduled Appointment: 02/20/2025    Che Gutierres  [facility]  CARDIOLOGY [address]  Scheduled Appointment: 04/14/2025

## 2025-01-16 NOTE — DISCHARGE NOTE PROVIDER - CARE PROVIDER_API CALL
Cayden Granda  Surgery  [address] [place] [address], [facility] [address]  [place] [address]  Phone: [phone]  Fax: [phone]  Follow Up Time:

## 2025-01-16 NOTE — DISCHARGE NOTE PROVIDER - HOSPITAL COURSE
This is a 44 year old female with hx of HTN, BEAU with CPAP, obesity, prior sleeve  gastrectomy 2019, now with weight regain, BMI 39.6 and reflux. On  1/15/2025 patient underwent lapa assisted robotic sleeve gastrectomy. Bariatric ERAS protocol followed to include preoperative and perioperative use of DVT and SSI prophylaxis as well as scheduled multi-modal non-opioid analgesics. Patient tolerated the procedure well  extubated in the operating room then transferred to the PACU in stable condition. Pt received prophylaxis multivitamin infusion while in the PACU. Once hemodynamically stable and effective pain control the patient was able to ambulate with assistance. Pt was also able to void within 4 hours following the procedure. Bariatric clear liquid diet started the day of surgery. Once Simon and Bariatric surgery  discharge criteria met, the patient was transferred to the designated bariatric unit and placed on bedside continuous pulse oximetry.    On POD #1 patient remained stable with no acute events overnight, has effective  pain control. Denies nausea and vomiting. Patient is ambulating independently and voiding as expected. The rest of the hospital course was uneventful and there were no post-operative complications identified.  Patient met 8-Point Bariatric Surgery D/C criteria and subsequently cleared for discharge home on POD#1.  No extended VTE prophylaxis , ([facility] VTE Risk Stratification Risk  low (<1% ). The patient will follow a protocol-derived staged meal progression supervised by the dietitian in the outpatient setting. All appropriate prescriptions obtained from [facility] prior to discharge. Written discharge instructions explained and given to include postoperative complications, medications and side effect, diet and  VTE prevention. The was also seen by the clinical pharmacist, dietician and . Patient will follow-up with Dr. Granda in 7-10 days, medical doctor and dietitian in 30 days. .

## 2025-01-16 NOTE — DIETITIAN INITIAL EVALUATION ADULT - OTHER INFO
Pt with multiple previous wt loss attempts per chart and was unable to lose and maintain significant wt loss. Pt with history of sleeve gastrectomy in 2019, lost ~ 150lbs but regained weight back after pregnancy.  Pre-surgical wt (H&P) noted as 222 pounds (12/27/24). Current wt of 216 pounds (1/15/25).

## 2025-01-16 NOTE — DIETITIAN INITIAL EVALUATION ADULT - PERTINENT MEDS FT
MEDICATIONS  (STANDING):  acetaminophen   IVPB .. 1000 milliGRAM(s) IV Intermittent every 6 hours  amLODIPine   Tablet 5 milliGRAM(s) Oral daily  heparin   Injectable 5000 Unit(s) SubCutaneous every 8 hours  ketorolac   Injectable 30 milliGRAM(s) IV Push every 6 hours  lactated ringers. 1000 milliLiter(s) (150 mL/Hr) IV Continuous <Continuous>  pantoprazole  Injectable 40 milliGRAM(s) IV Push every 24 hours    MEDICATIONS  (PRN):  ondansetron Injectable 4 milliGRAM(s) IV Push every 6 hours PRN Nausea and/or Vomiting

## 2025-01-16 NOTE — PROGRESS NOTE ADULT - ASSESSMENT
Assessment:  44y Female patient S/P robotic assisted sleeve gastrectomy convert to Hortensia-en-Y gastric bypass, hiatal hernia repair    Plan:  - Pain control IV Tylenol ATC, 30mg toradol ATC; antiemetics PRN  - Diet: Bariatric CLD  - IVF: LR @ 150  - PPI  - home meds restarted  - f/u AM labs   - DVT ppx: SQH, SCD's     *final plan pending discussion with day team     [facility]   [id number]. Assessment: 44y F hx HTN, BEAU no longer on CPAP, and morbid obesity S/P robotic assisted sleeve gastrectomy convert to Hortensia-en-Y gastric bypass, hiatal hernia repair    Plan:  - Pain control IV Tylenol ATC, 30mg toradol ATC; antiemetics PRN  - Diet: Bariatric CLD  - IVF: LR @ 150  - PPI  - home meds restarted  - f/u AM labs   - DVT ppx: SQH, SCD's       [facility] Surgery   [id number].

## 2025-01-16 NOTE — PHARMACOTHERAPY INTERVENTION NOTE - COMMENTS
S/p conversion to Hortensia-en-Y on 1/15/025.  Patient medication reconciliation completed. Patient currently taking:   amLODIPine 5 mg oral tablet: 1 tab(s) orally once a day   ibuprofen 400 mg oral tablet: 1 tab(s) orally every 8 hours, As Needed -- no longer taking  multivitamin: Apply patch topically once a day -- patient takes in the form of Angelo Melts  Vitamin B12 with Ubiquinone oral tablet, chewable: 2 tab(s) chewed once a day -- patient takes in the form of Angelo Melts  Vitamin D3 125 mcg (5000 intl units) oral capsule: 1 cap(s) orally once a day-- patient takes in the form of Angelo Melts      Patient was instructed to use crushed, dissolvable, chewable, or liquid formulations of medications for 1 month after surgery. Patient was informed to take daily multivitamins post surgically. Patient reeducated on NSAID avoidance (ibuprofen, ASA, naproxen, aleve) as they increase risk of GI bleeding; may use APAP for mild pain otherwise contact prescriber for consult. Patient was informed on indications and directions for administration for acetaminophen liquid, hyoscyamine SL, ondansetron ODT, lovenox SQ, and omeprazole DRSridhar Patient was instructed to take the medications as follows:  - Crush amlodipine  -Hold NSAIDs like ibuprofen  -Continue taking BariMelts are directed    Pre-op opioids: 0 MME  IntraOp opioids: 30 MME  PACU opioids: 5 MME  2 Jarred opioids: 0 MME  Discharge to home opioids: 0 MME    Mila Curiel, PharmD, BCPS  Clinical Pharmacy Specialist  Available on Teams

## 2025-01-16 NOTE — DIETITIAN INITIAL EVALUATION ADULT - NS FNS DIET ORDER
Diet, Clear Liquid:   Bariatric Clear Liquid (BARICLLIQ)     Special Instructions for Nursing:  Bariatric Clear Liquid (if no nausea vomiting) (01-15-25 @ 14:29)

## 2025-01-16 NOTE — DIETITIAN INITIAL EVALUATION ADULT - EDUCATION DIETARY MODIFICATIONS
Laparoscopic Hortensia-en-Y gastric bypass nutrition recommendations reviewed/reinforced (discharge instruction handout referenced).  Including vitamin/supplement compliance as instructed by team, full liquid diet items reviewed, sugar-free, low-fat, no straws, no carbonated beverages, 6 small meals per day, no water during meals-drink water 30min-1 hr prior to or after meals. Adequate hydration and adequate protein emphasized. Pt encouraged to follow-up with outpatient RD./teach back/(2) meets goals/outcomes/verbalization

## 2025-01-16 NOTE — DISCHARGE NOTE PROVIDER - NSDCCPTREATMENT_GEN_ALL_CORE_FT
PRINCIPAL PROCEDURE  Procedure: Conversion, gastrectomy, sleeve, to Hortensia-en-Y gastric bypass, robot-assisted, laparoscopic, using da Huong Xi  Findings and Treatment: bariatric liquid diet, analgesia, dietary supplement, increase physical activity, shower, follow up

## 2025-01-16 NOTE — DIETITIAN INITIAL EVALUATION ADULT - REASON FOR ADMISSION
Diaphragmatic hernia without obstruction and without gangrene    Chart reviewed, events noted. This is a "44y F hx HTN, BEAU no longer on CPAP, and morbid obesity S/P robotic assisted sleeve gastrectomy convert to Hortensia-en-Y gastric bypass, hiatal hernia repair"

## 2025-01-16 NOTE — DISCHARGE NOTE NURSING/CASE MANAGEMENT/SOCIAL WORK - FINANCIAL ASSISTANCE
[facility] provides services at a reduced cost to those who are determined to be eligible through [facility]’s financial assistance program. Information regarding [facility]’s financial assistance program can be found by going to [email or web address] or by calling [phone].

## 2025-01-20 ENCOUNTER — TRANSCRIPTION ENCOUNTER (OUTPATIENT)
Age: 45
End: 2025-01-20

## 2025-01-21 ENCOUNTER — NON-APPOINTMENT (OUTPATIENT)
Age: 45
End: 2025-01-21

## 2025-01-21 DIAGNOSIS — K44.9 DIAPHRAGMATIC HERNIA W/OUT OBSTRUCTION OR GANGRENE: ICD-10-CM

## 2025-01-21 DIAGNOSIS — K21.9 GASTRO-ESOPHAGEAL REFLUX DISEASE W/OUT ESOPHAGITIS: ICD-10-CM

## 2025-01-21 DIAGNOSIS — Z98.84 BARIATRIC SURGERY STATUS: ICD-10-CM

## 2025-01-22 LAB — SURGICAL PATHOLOGY STUDY: SIGNIFICANT CHANGE UP

## 2025-01-23 ENCOUNTER — NON-APPOINTMENT (OUTPATIENT)
Age: 45
End: 2025-01-23

## 2025-01-23 PROBLEM — Z98.84 S/P GASTRIC BYPASS: Status: ACTIVE | Noted: 2025-01-21

## 2025-01-30 ENCOUNTER — APPOINTMENT (OUTPATIENT)
Dept: SURGERY | Facility: CLINIC | Age: 45
End: 2025-01-30
Payer: MEDICAID

## 2025-01-30 ENCOUNTER — APPOINTMENT (OUTPATIENT)
Dept: PULMONOLOGY | Facility: CLINIC | Age: 45
End: 2025-01-30

## 2025-01-30 VITALS
BODY MASS INDEX: 37.79 KG/M2 | RESPIRATION RATE: 16 BRPM | SYSTOLIC BLOOD PRESSURE: 113 MMHG | OXYGEN SATURATION: 99 % | TEMPERATURE: 97.1 F | HEIGHT: 62 IN | DIASTOLIC BLOOD PRESSURE: 74 MMHG | WEIGHT: 205.38 LBS | HEART RATE: 94 BPM

## 2025-01-30 DIAGNOSIS — Z98.84 BARIATRIC SURGERY STATUS: ICD-10-CM

## 2025-01-30 PROCEDURE — 99024 POSTOP FOLLOW-UP VISIT: CPT

## 2025-02-20 ENCOUNTER — NON-APPOINTMENT (OUTPATIENT)
Age: 45
End: 2025-02-20

## 2025-02-20 ENCOUNTER — APPOINTMENT (OUTPATIENT)
Dept: SURGERY | Facility: CLINIC | Age: 45
End: 2025-02-20
Payer: MEDICAID

## 2025-02-20 VITALS
WEIGHT: 201 LBS | OXYGEN SATURATION: 99 % | HEART RATE: 97 BPM | TEMPERATURE: 97.2 F | BODY MASS INDEX: 36.99 KG/M2 | DIASTOLIC BLOOD PRESSURE: 70 MMHG | HEIGHT: 62 IN | SYSTOLIC BLOOD PRESSURE: 105 MMHG | RESPIRATION RATE: 16 BRPM

## 2025-02-20 DIAGNOSIS — Z98.84 BARIATRIC SURGERY STATUS: ICD-10-CM

## 2025-02-20 PROCEDURE — 99024 POSTOP FOLLOW-UP VISIT: CPT

## 2025-02-21 LAB
25(OH)D3 SERPL-MCNC: 44.3 NG/ML
ALBUMIN SERPL ELPH-MCNC: 4.2 G/DL
ALP BLD-CCNC: 47 U/L
ALT SERPL-CCNC: 10 U/L
ANION GAP SERPL CALC-SCNC: 14 MMOL/L
AST SERPL-CCNC: 14 U/L
BILIRUB SERPL-MCNC: 0.4 MG/DL
BUN SERPL-MCNC: 10 MG/DL
CALCIUM SERPL-MCNC: 9.3 MG/DL
CHLORIDE SERPL-SCNC: 104 MMOL/L
CO2 SERPL-SCNC: 22 MMOL/L
CREAT SERPL-MCNC: 0.5 MG/DL
EGFR: 119 ML/MIN/1.73M2
ESTIMATED AVERAGE GLUCOSE: 100 MG/DL
FERRITIN SERPL-MCNC: 8 NG/ML
FOLATE SERPL-MCNC: >20 NG/ML
GLUCOSE SERPL-MCNC: 61 MG/DL
HBA1C MFR BLD HPLC: 5.1 %
HCT VFR BLD CALC: 34.5 %
HGB BLD-MCNC: 10.2 G/DL
IRON SATN MFR SERPL: 6 %
IRON SERPL-MCNC: 21 UG/DL
MCHC RBC-ENTMCNC: 23.2 PG
MCHC RBC-ENTMCNC: 29.6 G/DL
MCV RBC AUTO: 78.4 FL
PLATELET # BLD AUTO: 441 K/UL
POTASSIUM SERPL-SCNC: 4.2 MMOL/L
PROT SERPL-MCNC: 7.1 G/DL
RBC # BLD: 4.4 M/UL
RBC # FLD: 15.6 %
SODIUM SERPL-SCNC: 140 MMOL/L
TIBC SERPL-MCNC: 375 UG/DL
UIBC SERPL-MCNC: 354 UG/DL
VIT B12 SERPL-MCNC: 598 PG/ML
WBC # FLD AUTO: 6.35 K/UL

## 2025-02-24 LAB
VIT A SERPL-MCNC: 24.1 UG/DL
VIT B1 SERPL-MCNC: 77.3 NMOL/L
VIT B6 SERPL-MCNC: 18.8 UG/L

## 2025-03-20 ENCOUNTER — APPOINTMENT (OUTPATIENT)
Dept: SURGERY | Facility: CLINIC | Age: 45
End: 2025-03-20
Payer: MEDICAID

## 2025-03-20 VITALS
TEMPERATURE: 96.4 F | SYSTOLIC BLOOD PRESSURE: 110 MMHG | WEIGHT: 198 LBS | OXYGEN SATURATION: 99 % | BODY MASS INDEX: 36.44 KG/M2 | RESPIRATION RATE: 16 BRPM | HEIGHT: 62 IN | DIASTOLIC BLOOD PRESSURE: 69 MMHG | HEART RATE: 89 BPM

## 2025-03-20 DIAGNOSIS — Z98.84 BARIATRIC SURGERY STATUS: ICD-10-CM

## 2025-03-20 PROCEDURE — 99024 POSTOP FOLLOW-UP VISIT: CPT

## 2025-04-16 ENCOUNTER — APPOINTMENT (OUTPATIENT)
Dept: CARDIOLOGY | Facility: CLINIC | Age: 45
End: 2025-04-16
Payer: MEDICAID

## 2025-04-16 ENCOUNTER — NON-APPOINTMENT (OUTPATIENT)
Age: 45
End: 2025-04-16

## 2025-04-16 VITALS
OXYGEN SATURATION: 99 % | RESPIRATION RATE: 16 BRPM | BODY MASS INDEX: 36.44 KG/M2 | HEIGHT: 62 IN | TEMPERATURE: 97 F | WEIGHT: 198 LBS | DIASTOLIC BLOOD PRESSURE: 88 MMHG | HEART RATE: 90 BPM | SYSTOLIC BLOOD PRESSURE: 127 MMHG

## 2025-04-16 DIAGNOSIS — I10 ESSENTIAL (PRIMARY) HYPERTENSION: ICD-10-CM

## 2025-04-16 PROCEDURE — 99213 OFFICE O/P EST LOW 20 MIN: CPT | Mod: 25

## 2025-04-16 PROCEDURE — 93000 ELECTROCARDIOGRAM COMPLETE: CPT

## 2025-04-23 ENCOUNTER — APPOINTMENT (OUTPATIENT)
Dept: GASTROENTEROLOGY | Facility: CLINIC | Age: 45
End: 2025-04-23
Payer: MEDICAID

## 2025-04-23 VITALS
TEMPERATURE: 98.1 F | HEIGHT: 62 IN | OXYGEN SATURATION: 99 % | HEART RATE: 72 BPM | DIASTOLIC BLOOD PRESSURE: 80 MMHG | BODY MASS INDEX: 36.62 KG/M2 | SYSTOLIC BLOOD PRESSURE: 132 MMHG | WEIGHT: 199 LBS

## 2025-04-23 DIAGNOSIS — A04.8 OTHER SPECIFIED BACTERIAL INTESTINAL INFECTIONS: ICD-10-CM

## 2025-04-23 PROCEDURE — 99214 OFFICE O/P EST MOD 30 MIN: CPT

## 2025-04-23 RX ORDER — OMEPRAZOLE 20 MG/1
20 CAPSULE, DELAYED RELEASE ORAL TWICE DAILY
Qty: 28 | Refills: 2 | Status: ACTIVE | COMMUNITY
Start: 2025-04-23 | End: 1900-01-01

## 2025-04-23 RX ORDER — METRONIDAZOLE 250 MG/1
250 TABLET ORAL
Qty: 80 | Refills: 1 | Status: ACTIVE | COMMUNITY
Start: 2025-04-23 | End: 1900-01-01

## 2025-04-23 RX ORDER — BISMUTH SUBSALICYLATE 262 MG/1
262 TABLET, CHEWABLE ORAL
Qty: 40 | Refills: 0 | Status: ACTIVE | COMMUNITY
Start: 2025-04-23 | End: 1900-01-01

## 2025-04-23 RX ORDER — TETRACYCLINE HYDROCHLORIDE 500 MG/1
500 CAPSULE ORAL EVERY 6 HOURS
Qty: 40 | Refills: 0 | Status: ACTIVE | COMMUNITY
Start: 2025-04-23 | End: 1900-01-01

## 2025-04-25 LAB — UREA BREATH TEST QL: NEGATIVE

## 2025-06-12 ENCOUNTER — APPOINTMENT (OUTPATIENT)
Dept: SURGERY | Facility: CLINIC | Age: 45
End: 2025-06-12
Payer: MEDICAID

## 2025-06-12 VITALS
HEIGHT: 62 IN | BODY MASS INDEX: 35.88 KG/M2 | OXYGEN SATURATION: 99 % | RESPIRATION RATE: 16 BRPM | TEMPERATURE: 97.3 F | HEART RATE: 88 BPM | WEIGHT: 195 LBS | DIASTOLIC BLOOD PRESSURE: 85 MMHG | SYSTOLIC BLOOD PRESSURE: 127 MMHG

## 2025-06-12 PROCEDURE — 99213 OFFICE O/P EST LOW 20 MIN: CPT

## 2025-06-17 ENCOUNTER — APPOINTMENT (OUTPATIENT)
Dept: SURGERY | Facility: CLINIC | Age: 45
End: 2025-06-17
Payer: MEDICAID

## 2025-06-17 VITALS
DIASTOLIC BLOOD PRESSURE: 94 MMHG | BODY MASS INDEX: 35.7 KG/M2 | OXYGEN SATURATION: 95 % | HEIGHT: 62 IN | HEART RATE: 85 BPM | WEIGHT: 194 LBS | SYSTOLIC BLOOD PRESSURE: 135 MMHG | TEMPERATURE: 98.6 F

## 2025-06-17 PROCEDURE — 97803 MED NUTRITION INDIV SUBSEQ: CPT

## 2025-09-03 ENCOUNTER — NON-APPOINTMENT (OUTPATIENT)
Age: 45
End: 2025-09-03

## 2025-09-11 ENCOUNTER — APPOINTMENT (OUTPATIENT)
Dept: SURGERY | Facility: CLINIC | Age: 45
End: 2025-09-11
Payer: MEDICAID

## 2025-09-11 VITALS
RESPIRATION RATE: 16 BRPM | BODY MASS INDEX: 34.69 KG/M2 | OXYGEN SATURATION: 100 % | DIASTOLIC BLOOD PRESSURE: 84 MMHG | WEIGHT: 188.5 LBS | SYSTOLIC BLOOD PRESSURE: 134 MMHG | TEMPERATURE: 97.3 F | HEART RATE: 90 BPM | HEIGHT: 62 IN

## 2025-09-11 DIAGNOSIS — E66.01 MORBID (SEVERE) OBESITY DUE TO EXCESS CALORIES: ICD-10-CM

## 2025-09-11 DIAGNOSIS — K44.9 DIAPHRAGMATIC HERNIA W/OUT OBSTRUCTION OR GANGRENE: ICD-10-CM

## 2025-09-11 DIAGNOSIS — Z98.84 BARIATRIC SURGERY STATUS: ICD-10-CM

## 2025-09-11 DIAGNOSIS — Z13.21 ENCOUNTER FOR SCREENING FOR NUTRITIONAL DISORDER: ICD-10-CM

## 2025-09-11 PROCEDURE — 99213 OFFICE O/P EST LOW 20 MIN: CPT

## 2025-09-15 LAB
25(OH)D3 SERPL-MCNC: 36.4 NG/ML
ALBUMIN SERPL ELPH-MCNC: 4.2 G/DL
ALP BLD-CCNC: 56 U/L
ALT SERPL-CCNC: 13 U/L
ANION GAP SERPL CALC-SCNC: 13 MMOL/L
AST SERPL-CCNC: 18 U/L
BILIRUB SERPL-MCNC: 0.4 MG/DL
BUN SERPL-MCNC: 13 MG/DL
CA-I SERPL-SCNC: 5.2 MG/DL
CALCIUM SERPL-MCNC: 9.6 MG/DL
CALCIUM SERPL-MCNC: 9.6 MG/DL
CHLORIDE SERPL-SCNC: 103 MMOL/L
CHOLEST SERPL-MCNC: 146 MG/DL
CO2 SERPL-SCNC: 22 MMOL/L
CREAT SERPL-MCNC: 0.45 MG/DL
EGFRCR SERPLBLD CKD-EPI 2021: 122 ML/MIN/1.73M2
FOLATE SERPL-MCNC: 15.3 NG/ML
GLUCOSE SERPL-MCNC: 71 MG/DL
HDLC SERPL-MCNC: 68 MG/DL
IRON SATN MFR SERPL: 22 %
IRON SERPL-MCNC: 81 UG/DL
LDLC SERPL-MCNC: 67 MG/DL
NONHDLC SERPL-MCNC: 77 MG/DL
PARATHYROID HORMONE INTACT: 29 PG/ML
POTASSIUM SERPL-SCNC: 4.4 MMOL/L
PREALB SERPL NEPH-MCNC: 17 MG/DL
PROT SERPL-MCNC: 7.3 G/DL
SODIUM SERPL-SCNC: 137 MMOL/L
TIBC SERPL-MCNC: 377 UG/DL
TRIGL SERPL-MCNC: 46 MG/DL
TSH SERPL-ACNC: 1.34 UIU/ML
UIBC SERPL-MCNC: 296 UG/DL
VIT B1 SERPL-MCNC: 97 NMOL/L
VIT B12 SERPL-MCNC: 340 PG/ML

## 2025-09-16 LAB
A-TOCOPHEROL VIT E SERPL-MCNC: 10.5 MG/L
BETA+GAMMA TOCOPHEROL SERPL-MCNC: 0.7 MG/L
VIT A SERPL-MCNC: 33.9 UG/DL
ZINC SERPL-MCNC: 78 UG/DL

## (undated) DEVICE — SUT VLOC 180 3-0 6" V-20 GREEN

## (undated) DEVICE — TUBING INSUFFLATION LAP FILTER 10FT

## (undated) DEVICE — DRAPE 3/4 SHEET W REINFORCEMENT 56X77"

## (undated) DEVICE — SOL IRR POUR H2O 250ML

## (undated) DEVICE — PREP CHLORAPREP HI-LITE ORANGE 26ML

## (undated) DEVICE — XI ARM NEEDLE DRIVER SUTURECUT MEGA 8MM

## (undated) DEVICE — XI ARM NEEDLE DRIVER MEGA

## (undated) DEVICE — SUT BIOSYN 4-0 18" P-12

## (undated) DEVICE — XI ARM GRASPER TIP UP FENESTRATED

## (undated) DEVICE — DRAPE MAYO STAND 30"

## (undated) DEVICE — DRAPE TOWEL BLUE 17" X 24"

## (undated) DEVICE — NDL HYPO SAFE 22G X 1.5" (BLACK)

## (undated) DEVICE — SUT VICRYL PLUS 0 27" CT-3

## (undated) DEVICE — TROCAR COVIDIEN VERSASTEP PLUS 12MM STANDARD

## (undated) DEVICE — TAPE SILK 3"

## (undated) DEVICE — XI DRAPE COLUMN

## (undated) DEVICE — MEDICATION LABELS W MARKER

## (undated) DEVICE — GOWN XL EXTRA LONG

## (undated) DEVICE — XI DRAPE ARM

## (undated) DEVICE — DRAPE BACK TABLE COVER HEAVY DUTY 60"

## (undated) DEVICE — VENODYNE/SCD SLEEVE CALF MEDIUM

## (undated) DEVICE — POSITIONER SAGE MOBILITY TRANSFER PAD

## (undated) DEVICE — SUT POLYSORB 2-0 30" V-20 UNDYED

## (undated) DEVICE — XI ARM SCISSOR MONO CURVED

## (undated) DEVICE — TROCAR COVIDIEN VERSAPORT BLADELESS OPTICAL 5MM STANDARD

## (undated) DEVICE — SHEARS COVIDIEN ENDO SHEAR 5MM X 45CM LONG W MONOPOLAR CAUTERY

## (undated) DEVICE — INSUFFLATION NDL COVIDIEN STEP 14G FOR STEP/VERSASTEP

## (undated) DEVICE — XI OBTURATOR OPTICAL BLADELESS 8MM

## (undated) DEVICE — ELCTR BOVIE PENCIL SMOKE EVACUATION

## (undated) DEVICE — COVIDIEN SIGNIA POWER CONTROL SHELL

## (undated) DEVICE — DRAIN PENROSE .25" X 18" LATEX

## (undated) DEVICE — WARMING BLANKET UPPER ADULT

## (undated) DEVICE — SUT CLIP LAPRA-TY ABSORBABLE SIZE 0.118 TO 0.12" VIOLET

## (undated) DEVICE — GOWN TRIMAX LG

## (undated) DEVICE — SUT POLYSORB 3-0 30" V-20 UNDYED

## (undated) DEVICE — XI VESSEL SEALER

## (undated) DEVICE — TROCAR COVIDIEN VERSASTEP PLUS 15MM STANDARD

## (undated) DEVICE — DRSG STERISTRIPS 0.5 X 4"

## (undated) DEVICE — D HELP - CLEARVIEW CLEARIFY SYSTEM

## (undated) DEVICE — DRSG MASTISOL

## (undated) DEVICE — STAPLER SKIN VISI-STAT 35 WIDE

## (undated) DEVICE — BLADE SCALPEL SAFETYLOCK #15

## (undated) DEVICE — TUBING STRYKEFLOW II SUCTION / IRRIGATOR

## (undated) DEVICE — IRRIGATION TRAY W PISTON SYRINGE 60ML

## (undated) DEVICE — BLADE SCALPEL SAFETYLOCK #10

## (undated) DEVICE — MARKING PEN W RULER

## (undated) DEVICE — GLV 8 PROTEXIS (WHITE)

## (undated) DEVICE — SOL IRR POUR NS 0.9% 500ML

## (undated) DEVICE — GLV 8 PROTEXIS (BLUE)

## (undated) DEVICE — PACK LAP CHOLE LAP APPENDECTOMY

## (undated) DEVICE — XI ARM PERMANENT CAUTERY HOOK

## (undated) DEVICE — VISIGI 3D ROUX-EN-Y 32FR

## (undated) DEVICE — POSITIONER FOAM EGG CRATE ULNAR 2PCS (PINK)

## (undated) DEVICE — SPECIMEN CONTAINER 100ML

## (undated) DEVICE — XI ARM FORCEP CADIERE 8MM